# Patient Record
Sex: FEMALE | Race: WHITE | Employment: FULL TIME | URBAN - NONMETROPOLITAN AREA
[De-identification: names, ages, dates, MRNs, and addresses within clinical notes are randomized per-mention and may not be internally consistent; named-entity substitution may affect disease eponyms.]

---

## 2017-02-27 ENCOUNTER — OFFICE VISIT (OUTPATIENT)
Dept: CARDIOLOGY | Age: 57
End: 2017-02-27

## 2017-02-27 VITALS
WEIGHT: 176.6 LBS | HEIGHT: 61 IN | BODY MASS INDEX: 33.34 KG/M2 | SYSTOLIC BLOOD PRESSURE: 152 MMHG | DIASTOLIC BLOOD PRESSURE: 94 MMHG | HEART RATE: 57 BPM

## 2017-02-27 DIAGNOSIS — R06.02 SOB (SHORTNESS OF BREATH) ON EXERTION: Primary | ICD-10-CM

## 2017-02-27 DIAGNOSIS — R00.2 INTERMITTENT PALPITATIONS: ICD-10-CM

## 2017-02-27 DIAGNOSIS — I10 ESSENTIAL HYPERTENSION: ICD-10-CM

## 2017-02-27 PROCEDURE — 99213 OFFICE O/P EST LOW 20 MIN: CPT | Performed by: INTERNAL MEDICINE

## 2017-02-27 PROCEDURE — 93000 ELECTROCARDIOGRAM COMPLETE: CPT | Performed by: INTERNAL MEDICINE

## 2017-08-28 ENCOUNTER — OFFICE VISIT (OUTPATIENT)
Dept: CARDIOLOGY CLINIC | Age: 57
End: 2017-08-28
Payer: COMMERCIAL

## 2017-08-28 VITALS
HEART RATE: 56 BPM | DIASTOLIC BLOOD PRESSURE: 84 MMHG | WEIGHT: 182.5 LBS | SYSTOLIC BLOOD PRESSURE: 152 MMHG | BODY MASS INDEX: 34.48 KG/M2

## 2017-08-28 DIAGNOSIS — I10 ESSENTIAL HYPERTENSION: Primary | ICD-10-CM

## 2017-08-28 DIAGNOSIS — R06.02 SOB (SHORTNESS OF BREATH) ON EXERTION: ICD-10-CM

## 2017-08-28 DIAGNOSIS — R00.2 INTERMITTENT PALPITATIONS: ICD-10-CM

## 2017-08-28 PROCEDURE — 99213 OFFICE O/P EST LOW 20 MIN: CPT | Performed by: INTERNAL MEDICINE

## 2017-08-28 RX ORDER — ASPIRIN 81 MG/1
81 TABLET ORAL DAILY
Qty: 90 TABLET | Refills: 3 | Status: SHIPPED | OUTPATIENT
Start: 2017-08-28 | End: 2021-04-06

## 2017-08-28 RX ORDER — HYDROXYCHLOROQUINE SULFATE 200 MG/1
200 TABLET, FILM COATED ORAL 2 TIMES DAILY
COMMUNITY
End: 2021-04-06

## 2017-08-28 RX ORDER — MELOXICAM 15 MG/1
15 TABLET ORAL DAILY
Status: ON HOLD | COMMUNITY
End: 2018-04-09 | Stop reason: HOSPADM

## 2017-09-11 ENCOUNTER — HOSPITAL ENCOUNTER (OUTPATIENT)
Age: 57
Discharge: HOME | End: 2017-09-11
Payer: COMMERCIAL

## 2017-09-11 LAB
ALT SERPL-CCNC: 18 U/L (ref 11–66)
BASOPHILS # BLD: 0.6 %
BASOPHILS ABSOLUTE: 0 THOU/MM3 (ref 0–0.1)
C-REACTIVE PROTEIN: 0.86 MG/DL (ref 0–1)
CREAT SERPL-MCNC: 0.8 MG/DL (ref 0.4–1.2)
EOSINOPHIL # BLD: 1.4 %
EOSINOPHILS ABSOLUTE: 0.1 THOU/MM3 (ref 0–0.4)
GFR SERPL CREATININE-BSD FRML MDRD: 74 ML/MIN/1.73M2
HCT VFR BLD CALC: 40.3 % (ref 37–47)
HEMOGLOBIN: 13.8 GM/DL (ref 12–16)
LYMPHOCYTES # BLD: 15.8 %
LYMPHOCYTES ABSOLUTE: 1 THOU/MM3 (ref 1–4.8)
MCH RBC QN AUTO: 32.2 PG (ref 27–31)
MCHC RBC AUTO-ENTMCNC: 34.1 GM/DL (ref 33–37)
MCV RBC AUTO: 94.3 FL (ref 81–99)
MONOCYTES # BLD: 6.5 %
MONOCYTES ABSOLUTE: 0.4 THOU/MM3 (ref 0.4–1.3)
NUCLEATED RED BLOOD CELLS: 0 /100 WBC
PDW BLD-RTO: 13.8 % (ref 11.5–14.5)
PLATELET # BLD: 241 THOU/MM3 (ref 130–400)
PMV BLD AUTO: 9.5 MCM (ref 7.4–10.4)
RBC # BLD: 4.28 MILL/MM3 (ref 4.2–5.4)
RBC # BLD: NORMAL 10*6/UL
SEDIMENTATION RATE, ERYTHROCYTE: 23 MM/HR (ref 0–20)
SEG NEUTROPHILS: 75.7 %
SEGMENTED NEUTROPHILS ABSOLUTE COUNT: 4.8 THOU/MM3 (ref 1.8–7.7)
WBC # BLD: 6.3 THOU/MM3 (ref 4.8–10.8)

## 2017-09-11 PROCEDURE — 36415 COLL VENOUS BLD VENIPUNCTURE: CPT

## 2017-09-11 PROCEDURE — 84460 ALANINE AMINO (ALT) (SGPT): CPT

## 2017-09-11 PROCEDURE — 86140 C-REACTIVE PROTEIN: CPT

## 2017-09-11 PROCEDURE — 85651 RBC SED RATE NONAUTOMATED: CPT

## 2017-09-11 PROCEDURE — 82565 ASSAY OF CREATININE: CPT

## 2017-09-11 PROCEDURE — 85025 COMPLETE CBC W/AUTO DIFF WBC: CPT

## 2017-12-11 ENCOUNTER — HOSPITAL ENCOUNTER (OUTPATIENT)
Age: 57
Discharge: HOME | End: 2017-12-11
Payer: COMMERCIAL

## 2017-12-11 LAB
ALT SERPL-CCNC: 13 U/L (ref 11–66)
BASOPHILS # BLD: 0.7 %
BASOPHILS ABSOLUTE: 0 THOU/MM3 (ref 0–0.1)
C-REACTIVE PROTEIN: 1.24 MG/DL (ref 0–1)
CREAT SERPL-MCNC: 0.8 MG/DL (ref 0.4–1.2)
EOSINOPHIL # BLD: 1.2 %
EOSINOPHILS ABSOLUTE: 0.1 THOU/MM3 (ref 0–0.4)
GFR SERPL CREATININE-BSD FRML MDRD: 74 ML/MIN/1.73M2
HCT VFR BLD CALC: 41.3 % (ref 37–47)
HEMOGLOBIN: 14.1 GM/DL (ref 12–16)
LYMPHOCYTES # BLD: 20.1 %
LYMPHOCYTES ABSOLUTE: 1 THOU/MM3 (ref 1–4.8)
MCH RBC QN AUTO: 31.8 PG (ref 27–31)
MCHC RBC AUTO-ENTMCNC: 34.1 GM/DL (ref 33–37)
MCV RBC AUTO: 93.4 FL (ref 81–99)
MONOCYTES # BLD: 7.2 %
MONOCYTES ABSOLUTE: 0.4 THOU/MM3 (ref 0.4–1.3)
NUCLEATED RED BLOOD CELLS: 0 /100 WBC
PDW BLD-RTO: 14.2 % (ref 11.5–14.5)
PLATELET # BLD: 259 THOU/MM3 (ref 130–400)
PMV BLD AUTO: 9.2 MCM (ref 7.4–10.4)
RBC # BLD: 4.42 MILL/MM3 (ref 4.2–5.4)
SEDIMENTATION RATE, ERYTHROCYTE: 30 MM/HR (ref 0–20)
SEG NEUTROPHILS: 70.8 %
SEGMENTED NEUTROPHILS ABSOLUTE COUNT: 3.5 THOU/MM3 (ref 1.8–7.7)
WBC # BLD: 4.9 THOU/MM3 (ref 4.8–10.8)

## 2017-12-11 PROCEDURE — 85025 COMPLETE CBC W/AUTO DIFF WBC: CPT

## 2017-12-11 PROCEDURE — 84460 ALANINE AMINO (ALT) (SGPT): CPT

## 2017-12-11 PROCEDURE — 85651 RBC SED RATE NONAUTOMATED: CPT

## 2017-12-11 PROCEDURE — 36415 COLL VENOUS BLD VENIPUNCTURE: CPT

## 2017-12-11 PROCEDURE — 82565 ASSAY OF CREATININE: CPT

## 2017-12-11 PROCEDURE — 86140 C-REACTIVE PROTEIN: CPT

## 2018-04-07 ENCOUNTER — HOSPITAL ENCOUNTER (OUTPATIENT)
Age: 58
Setting detail: OBSERVATION
Discharge: HOME | End: 2018-04-09
Attending: INTERNAL MEDICINE | Admitting: INTERNAL MEDICINE
Payer: COMMERCIAL

## 2018-04-07 ENCOUNTER — APPOINTMENT (OUTPATIENT)
Dept: GENERAL RADIOLOGY | Age: 58
End: 2018-04-07
Payer: COMMERCIAL

## 2018-04-07 ENCOUNTER — APPOINTMENT (OUTPATIENT)
Dept: CT IMAGING | Age: 58
End: 2018-04-07
Payer: COMMERCIAL

## 2018-04-07 DIAGNOSIS — R06.02 SOB (SHORTNESS OF BREATH) ON EXERTION: Primary | ICD-10-CM

## 2018-04-07 PROBLEM — R07.9 CHEST PAIN: Status: ACTIVE | Noted: 2018-04-07

## 2018-04-07 LAB
ALBUMIN SERPL-MCNC: 4.3 G/DL (ref 3.5–5.1)
ALLEN TEST: POSITIVE
ALP BLD-CCNC: 105 U/L (ref 38–126)
ALT SERPL-CCNC: 10 U/L (ref 11–66)
ANION GAP SERPL CALCULATED.3IONS-SCNC: 20 MEQ/L (ref 8–16)
AST SERPL-CCNC: 15 U/L (ref 5–40)
BASE EXCESS (CALCULATED): -2 MMOL/L (ref -2.5–2.5)
BASOPHILS # BLD: 0 %
BASOPHILS ABSOLUTE: 0 THOU/MM3 (ref 0–0.1)
BILIRUB SERPL-MCNC: 0.8 MG/DL (ref 0.3–1.2)
BILIRUBIN DIRECT: < 0.2 MG/DL (ref 0–0.3)
BUN BLDV-MCNC: 15 MG/DL (ref 7–22)
CALCIUM SERPL-MCNC: 9.4 MG/DL (ref 8.5–10.5)
CHLORIDE BLD-SCNC: 96 MEQ/L (ref 98–111)
CO2: 20 MEQ/L (ref 23–33)
COLLECTED BY:: ABNORMAL
CREAT SERPL-MCNC: 0.9 MG/DL (ref 0.4–1.2)
D-DIMER QUANTITATIVE: 1081 NG/ML FEU (ref 0–500)
DEVICE: ABNORMAL
EKG ATRIAL RATE: 86 BPM
EKG P AXIS: 23 DEGREES
EKG P-R INTERVAL: 154 MS
EKG Q-T INTERVAL: 398 MS
EKG QRS DURATION: 82 MS
EKG QTC CALCULATION (BAZETT): 476 MS
EKG R AXIS: -43 DEGREES
EKG T AXIS: 17 DEGREES
EKG VENTRICULAR RATE: 86 BPM
EOSINOPHIL # BLD: 0.1 %
EOSINOPHILS ABSOLUTE: 0 THOU/MM3 (ref 0–0.4)
FLU A ANTIGEN: NEGATIVE
FLU B ANTIGEN: NEGATIVE
GFR SERPL CREATININE-BSD FRML MDRD: 64 ML/MIN/1.73M2
GLUCOSE BLD-MCNC: 112 MG/DL (ref 70–108)
HCO3: 17 MMOL/L (ref 23–28)
HCT VFR BLD CALC: 42.8 % (ref 37–47)
HEMOGLOBIN: 14.6 GM/DL (ref 12–16)
LIPASE: 25.9 U/L (ref 5.6–51.3)
LYMPHOCYTES # BLD: 7.5 %
LYMPHOCYTES ABSOLUTE: 0.9 THOU/MM3 (ref 1–4.8)
MCH RBC QN AUTO: 31.1 PG (ref 27–31)
MCHC RBC AUTO-ENTMCNC: 34.2 GM/DL (ref 33–37)
MCV RBC AUTO: 90.9 FL (ref 81–99)
MONOCYTES # BLD: 6.2 %
MONOCYTES ABSOLUTE: 0.7 THOU/MM3 (ref 0.4–1.3)
NUCLEATED RED BLOOD CELLS: 0 /100 WBC
O2 SATURATION: 97 %
OSMOLALITY CALCULATION: 273.5 MOSMOL/KG (ref 275–300)
PCO2: 17 MMHG (ref 35–45)
PDW BLD-RTO: 13.7 % (ref 11.5–14.5)
PH BLOOD GAS: 7.61 (ref 7.35–7.45)
PLATELET # BLD: 239 THOU/MM3 (ref 130–400)
PMV BLD AUTO: 9.8 FL (ref 7.4–10.4)
PO2: 67 MMHG (ref 71–104)
POTASSIUM SERPL-SCNC: 4 MEQ/L (ref 3.5–5.2)
PRO-BNP: 133.3 PG/ML (ref 0–900)
RBC # BLD: 4.71 MILL/MM3 (ref 4.2–5.4)
SEG NEUTROPHILS: 86.2 %
SEGMENTED NEUTROPHILS ABSOLUTE COUNT: 10.2 THOU/MM3 (ref 1.8–7.7)
SODIUM BLD-SCNC: 136 MEQ/L (ref 135–145)
SOURCE, BLOOD GAS: ABNORMAL
TOTAL PROTEIN: 8.2 G/DL (ref 6.1–8)
TROPONIN T: < 0.01 NG/ML
WBC # BLD: 11.8 THOU/MM3 (ref 4.8–10.8)

## 2018-04-07 PROCEDURE — 6360000004 HC RX CONTRAST MEDICATION: Performed by: PHYSICIAN ASSISTANT

## 2018-04-07 PROCEDURE — 82248 BILIRUBIN DIRECT: CPT

## 2018-04-07 PROCEDURE — 93005 ELECTROCARDIOGRAM TRACING: CPT | Performed by: PHYSICIAN ASSISTANT

## 2018-04-07 PROCEDURE — 6370000000 HC RX 637 (ALT 250 FOR IP): Performed by: PHYSICIAN ASSISTANT

## 2018-04-07 PROCEDURE — 2580000003 HC RX 258: Performed by: INTERNAL MEDICINE

## 2018-04-07 PROCEDURE — 36600 WITHDRAWAL OF ARTERIAL BLOOD: CPT

## 2018-04-07 PROCEDURE — 6360000002 HC RX W HCPCS: Performed by: INTERNAL MEDICINE

## 2018-04-07 PROCEDURE — 99285 EMERGENCY DEPT VISIT HI MDM: CPT

## 2018-04-07 PROCEDURE — 71046 X-RAY EXAM CHEST 2 VIEWS: CPT

## 2018-04-07 PROCEDURE — G0378 HOSPITAL OBSERVATION PER HR: HCPCS

## 2018-04-07 PROCEDURE — 80053 COMPREHEN METABOLIC PANEL: CPT

## 2018-04-07 PROCEDURE — 6370000000 HC RX 637 (ALT 250 FOR IP): Performed by: INTERNAL MEDICINE

## 2018-04-07 PROCEDURE — 83880 ASSAY OF NATRIURETIC PEPTIDE: CPT

## 2018-04-07 PROCEDURE — 85379 FIBRIN DEGRADATION QUANT: CPT

## 2018-04-07 PROCEDURE — 99233 SBSQ HOSP IP/OBS HIGH 50: CPT | Performed by: INTERNAL MEDICINE

## 2018-04-07 PROCEDURE — 71275 CT ANGIOGRAPHY CHEST: CPT

## 2018-04-07 PROCEDURE — 85025 COMPLETE CBC W/AUTO DIFF WBC: CPT

## 2018-04-07 PROCEDURE — 94640 AIRWAY INHALATION TREATMENT: CPT

## 2018-04-07 PROCEDURE — 82803 BLOOD GASES ANY COMBINATION: CPT

## 2018-04-07 PROCEDURE — 87804 INFLUENZA ASSAY W/OPTIC: CPT

## 2018-04-07 PROCEDURE — 84484 ASSAY OF TROPONIN QUANT: CPT

## 2018-04-07 PROCEDURE — 93010 ELECTROCARDIOGRAM REPORT: CPT | Performed by: NUCLEAR MEDICINE

## 2018-04-07 PROCEDURE — 83690 ASSAY OF LIPASE: CPT

## 2018-04-07 PROCEDURE — 36415 COLL VENOUS BLD VENIPUNCTURE: CPT

## 2018-04-07 RX ORDER — FOLIC ACID 1 MG/1
1 TABLET ORAL DAILY
Status: DISCONTINUED | OUTPATIENT
Start: 2018-04-07 | End: 2018-04-09

## 2018-04-07 RX ORDER — POTASSIUM CHLORIDE 20 MEQ/1
40 TABLET, EXTENDED RELEASE ORAL PRN
Status: DISCONTINUED | OUTPATIENT
Start: 2018-04-07 | End: 2018-04-09 | Stop reason: HOSPADM

## 2018-04-07 RX ORDER — AZITHROMYCIN 250 MG/1
250 TABLET, FILM COATED ORAL DAILY
Status: DISCONTINUED | OUTPATIENT
Start: 2018-04-07 | End: 2018-04-09 | Stop reason: HOSPADM

## 2018-04-07 RX ORDER — POTASSIUM CHLORIDE 20MEQ/15ML
40 LIQUID (ML) ORAL PRN
Status: DISCONTINUED | OUTPATIENT
Start: 2018-04-07 | End: 2018-04-09 | Stop reason: HOSPADM

## 2018-04-07 RX ORDER — METOPROLOL TARTRATE 50 MG/1
25 TABLET, FILM COATED ORAL 2 TIMES DAILY
Status: DISCONTINUED | OUTPATIENT
Start: 2018-04-07 | End: 2018-04-09 | Stop reason: HOSPADM

## 2018-04-07 RX ORDER — IPRATROPIUM BROMIDE AND ALBUTEROL SULFATE 2.5; .5 MG/3ML; MG/3ML
1 SOLUTION RESPIRATORY (INHALATION)
Status: DISCONTINUED | OUTPATIENT
Start: 2018-04-08 | End: 2018-04-09 | Stop reason: HOSPADM

## 2018-04-07 RX ORDER — ASPIRIN 81 MG/1
324 TABLET, CHEWABLE ORAL ONCE
Status: COMPLETED | OUTPATIENT
Start: 2018-04-07 | End: 2018-04-07

## 2018-04-07 RX ORDER — PREDNISONE 10 MG/1
15 TABLET ORAL DAILY
Status: DISCONTINUED | OUTPATIENT
Start: 2018-04-10 | End: 2018-04-09 | Stop reason: HOSPADM

## 2018-04-07 RX ORDER — ACETAMINOPHEN 325 MG/1
650 TABLET ORAL EVERY 4 HOURS PRN
Status: DISCONTINUED | OUTPATIENT
Start: 2018-04-07 | End: 2018-04-09 | Stop reason: HOSPADM

## 2018-04-07 RX ORDER — PREDNISONE 10 MG/1
10 TABLET ORAL DAILY
Status: DISCONTINUED | OUTPATIENT
Start: 2018-04-13 | End: 2018-04-09 | Stop reason: HOSPADM

## 2018-04-07 RX ORDER — PREDNISONE 20 MG/1
20 TABLET ORAL DAILY
Status: COMPLETED | OUTPATIENT
Start: 2018-04-07 | End: 2018-04-09

## 2018-04-07 RX ORDER — HYDROXYCHLOROQUINE SULFATE 200 MG/1
200 TABLET, FILM COATED ORAL 2 TIMES DAILY
Status: DISCONTINUED | OUTPATIENT
Start: 2018-04-07 | End: 2018-04-09 | Stop reason: HOSPADM

## 2018-04-07 RX ORDER — OYSTER SHELL CALCIUM WITH VITAMIN D 500; 200 MG/1; [IU]/1
1 TABLET, FILM COATED ORAL 2 TIMES DAILY WITH MEALS
Status: DISCONTINUED | OUTPATIENT
Start: 2018-04-07 | End: 2018-04-07

## 2018-04-07 RX ORDER — PREDNISONE 10 MG/1
5 TABLET ORAL DAILY
Status: DISCONTINUED | OUTPATIENT
Start: 2018-04-16 | End: 2018-04-09 | Stop reason: HOSPADM

## 2018-04-07 RX ORDER — ALBUTEROL SULFATE 2.5 MG/3ML
2.5 SOLUTION RESPIRATORY (INHALATION) EVERY 4 HOURS
Status: DISCONTINUED | OUTPATIENT
Start: 2018-04-07 | End: 2018-04-07 | Stop reason: ALTCHOICE

## 2018-04-07 RX ORDER — ASPIRIN 81 MG/1
81 TABLET, CHEWABLE ORAL DAILY
Status: DISCONTINUED | OUTPATIENT
Start: 2018-04-07 | End: 2018-04-07 | Stop reason: SDUPTHER

## 2018-04-07 RX ORDER — SODIUM CHLORIDE 0.9 % (FLUSH) 0.9 %
10 SYRINGE (ML) INJECTION EVERY 12 HOURS SCHEDULED
Status: DISCONTINUED | OUTPATIENT
Start: 2018-04-07 | End: 2018-04-09 | Stop reason: HOSPADM

## 2018-04-07 RX ORDER — SODIUM CHLORIDE 0.9 % (FLUSH) 0.9 %
10 SYRINGE (ML) INJECTION PRN
Status: DISCONTINUED | OUTPATIENT
Start: 2018-04-07 | End: 2018-04-09 | Stop reason: HOSPADM

## 2018-04-07 RX ORDER — ONDANSETRON 2 MG/ML
4 INJECTION INTRAMUSCULAR; INTRAVENOUS EVERY 6 HOURS PRN
Status: DISCONTINUED | OUTPATIENT
Start: 2018-04-07 | End: 2018-04-09 | Stop reason: HOSPADM

## 2018-04-07 RX ORDER — FAMOTIDINE 20 MG/1
20 TABLET, FILM COATED ORAL 2 TIMES DAILY
Status: DISCONTINUED | OUTPATIENT
Start: 2018-04-07 | End: 2018-04-09 | Stop reason: HOSPADM

## 2018-04-07 RX ORDER — ASPIRIN 81 MG/1
81 TABLET ORAL DAILY
Status: DISCONTINUED | OUTPATIENT
Start: 2018-04-08 | End: 2018-04-09 | Stop reason: HOSPADM

## 2018-04-07 RX ORDER — POTASSIUM CHLORIDE 7.45 MG/ML
10 INJECTION INTRAVENOUS PRN
Status: DISCONTINUED | OUTPATIENT
Start: 2018-04-07 | End: 2018-04-09 | Stop reason: HOSPADM

## 2018-04-07 RX ORDER — NITROGLYCERIN 0.4 MG/1
0.4 TABLET SUBLINGUAL EVERY 5 MIN PRN
Status: DISCONTINUED | OUTPATIENT
Start: 2018-04-07 | End: 2018-04-09 | Stop reason: HOSPADM

## 2018-04-07 RX ADMIN — IOPAMIDOL 80 ML: 755 INJECTION, SOLUTION INTRAVENOUS at 13:08

## 2018-04-07 RX ADMIN — Medication 10 ML: at 21:00

## 2018-04-07 RX ADMIN — ALBUTEROL SULFATE 2.5 MG: 2.5 SOLUTION RESPIRATORY (INHALATION) at 17:26

## 2018-04-07 RX ADMIN — METOPROLOL TARTRATE 25 MG: 50 TABLET, FILM COATED ORAL at 20:55

## 2018-04-07 RX ADMIN — PREDNISONE 20 MG: 20 TABLET ORAL at 17:09

## 2018-04-07 RX ADMIN — FAMOTIDINE 20 MG: 20 TABLET, FILM COATED ORAL at 20:55

## 2018-04-07 RX ADMIN — IPRATROPIUM BROMIDE 0.5 MG: 0.5 SOLUTION RESPIRATORY (INHALATION) at 17:26

## 2018-04-07 RX ADMIN — AZITHROMYCIN 250 MG: 250 TABLET, FILM COATED ORAL at 17:09

## 2018-04-07 RX ADMIN — ASPIRIN 81 MG 243 MG: 81 TABLET ORAL at 13:21

## 2018-04-07 RX ADMIN — HYDROXYCHLOROQUINE SULFATE 200 MG: 200 TABLET, FILM COATED ORAL at 20:55

## 2018-04-07 RX ADMIN — FOLIC ACID 1 MG: 1 TABLET ORAL at 17:10

## 2018-04-07 ASSESSMENT — PAIN DESCRIPTION - LOCATION: LOCATION: CHEST

## 2018-04-07 ASSESSMENT — ENCOUNTER SYMPTOMS
SORE THROAT: 0
ABDOMINAL PAIN: 0
SINUS PAIN: 0
CHEST TIGHTNESS: 1
BACK PAIN: 0
EYE REDNESS: 0
DIARRHEA: 0
VOMITING: 0
RHINORRHEA: 1
CONSTIPATION: 0
COLOR CHANGE: 0
COUGH: 1
NAUSEA: 0
SINUS PRESSURE: 0
WHEEZING: 0
EYE DISCHARGE: 0
SHORTNESS OF BREATH: 1

## 2018-04-07 ASSESSMENT — PAIN DESCRIPTION - DESCRIPTORS
DESCRIPTORS: TIGHTNESS
DESCRIPTORS: TIGHTNESS

## 2018-04-07 ASSESSMENT — PAIN DESCRIPTION - PAIN TYPE: TYPE: ACUTE PAIN

## 2018-04-07 ASSESSMENT — PAIN DESCRIPTION - PROGRESSION: CLINICAL_PROGRESSION: GRADUALLY IMPROVING

## 2018-04-07 ASSESSMENT — PAIN DESCRIPTION - FREQUENCY
FREQUENCY: CONTINUOUS
FREQUENCY: CONTINUOUS

## 2018-04-07 ASSESSMENT — PAIN SCALES - GENERAL: PAINLEVEL_OUTOF10: 5

## 2018-04-07 ASSESSMENT — PAIN DESCRIPTION - ORIENTATION: ORIENTATION: MID

## 2018-04-08 LAB
ANION GAP SERPL CALCULATED.3IONS-SCNC: 15 MEQ/L (ref 8–16)
BUN BLDV-MCNC: 16 MG/DL (ref 7–22)
CALCIUM SERPL-MCNC: 9.4 MG/DL (ref 8.5–10.5)
CHLORIDE BLD-SCNC: 100 MEQ/L (ref 98–111)
CHOLESTEROL, TOTAL: 179 MG/DL (ref 100–199)
CO2: 23 MEQ/L (ref 23–33)
CREAT SERPL-MCNC: 0.7 MG/DL (ref 0.4–1.2)
GFR SERPL CREATININE-BSD FRML MDRD: 86 ML/MIN/1.73M2
GLUCOSE BLD-MCNC: 134 MG/DL (ref 70–108)
HCT VFR BLD CALC: 38 % (ref 37–47)
HDLC SERPL-MCNC: 67 MG/DL
HEMOGLOBIN: 13.3 GM/DL (ref 12–16)
LDL CHOLESTEROL CALCULATED: 103 MG/DL
MCH RBC QN AUTO: 32.2 PG (ref 27–31)
MCHC RBC AUTO-ENTMCNC: 35.1 GM/DL (ref 33–37)
MCV RBC AUTO: 91.8 FL (ref 81–99)
PDW BLD-RTO: 13.4 % (ref 11.5–14.5)
PLATELET # BLD: 228 THOU/MM3 (ref 130–400)
PMV BLD AUTO: 10 FL (ref 7.4–10.4)
POTASSIUM REFLEX MAGNESIUM: 4.3 MEQ/L (ref 3.5–5.2)
RBC # BLD: 4.14 MILL/MM3 (ref 4.2–5.4)
SODIUM BLD-SCNC: 138 MEQ/L (ref 135–145)
TRIGL SERPL-MCNC: 47 MG/DL (ref 0–199)
WBC # BLD: 9.7 THOU/MM3 (ref 4.8–10.8)

## 2018-04-08 PROCEDURE — 36415 COLL VENOUS BLD VENIPUNCTURE: CPT

## 2018-04-08 PROCEDURE — 80048 BASIC METABOLIC PNL TOTAL CA: CPT

## 2018-04-08 PROCEDURE — 80061 LIPID PANEL: CPT

## 2018-04-08 PROCEDURE — 2580000003 HC RX 258: Performed by: INTERNAL MEDICINE

## 2018-04-08 PROCEDURE — 94640 AIRWAY INHALATION TREATMENT: CPT

## 2018-04-08 PROCEDURE — 6370000000 HC RX 637 (ALT 250 FOR IP): Performed by: INTERNAL MEDICINE

## 2018-04-08 PROCEDURE — G0378 HOSPITAL OBSERVATION PER HR: HCPCS

## 2018-04-08 PROCEDURE — 85027 COMPLETE CBC AUTOMATED: CPT

## 2018-04-08 RX ADMIN — IPRATROPIUM BROMIDE AND ALBUTEROL SULFATE 1 AMPULE: .5; 3 SOLUTION RESPIRATORY (INHALATION) at 21:26

## 2018-04-08 RX ADMIN — FAMOTIDINE 20 MG: 20 TABLET, FILM COATED ORAL at 20:23

## 2018-04-08 RX ADMIN — FAMOTIDINE 20 MG: 20 TABLET, FILM COATED ORAL at 08:52

## 2018-04-08 RX ADMIN — HYDROXYCHLOROQUINE SULFATE 200 MG: 200 TABLET, FILM COATED ORAL at 08:52

## 2018-04-08 RX ADMIN — IPRATROPIUM BROMIDE AND ALBUTEROL SULFATE 1 AMPULE: .5; 3 SOLUTION RESPIRATORY (INHALATION) at 12:54

## 2018-04-08 RX ADMIN — ASPIRIN 81 MG: 81 TABLET ORAL at 08:50

## 2018-04-08 RX ADMIN — ACETAMINOPHEN 650 MG: 325 TABLET ORAL at 20:23

## 2018-04-08 RX ADMIN — METOPROLOL TARTRATE 25 MG: 50 TABLET, FILM COATED ORAL at 20:22

## 2018-04-08 RX ADMIN — Medication 10 ML: at 08:53

## 2018-04-08 RX ADMIN — IPRATROPIUM BROMIDE AND ALBUTEROL SULFATE 1 AMPULE: .5; 3 SOLUTION RESPIRATORY (INHALATION) at 16:16

## 2018-04-08 RX ADMIN — PREDNISONE 20 MG: 20 TABLET ORAL at 08:53

## 2018-04-08 RX ADMIN — AZITHROMYCIN 250 MG: 250 TABLET, FILM COATED ORAL at 08:52

## 2018-04-08 RX ADMIN — ACETAMINOPHEN 650 MG: 325 TABLET ORAL at 11:54

## 2018-04-08 RX ADMIN — Medication 10 ML: at 20:23

## 2018-04-08 RX ADMIN — HYDROXYCHLOROQUINE SULFATE 200 MG: 200 TABLET, FILM COATED ORAL at 20:22

## 2018-04-08 RX ADMIN — FOLIC ACID 1 MG: 1 TABLET ORAL at 08:52

## 2018-04-08 RX ADMIN — METOPROLOL TARTRATE 25 MG: 50 TABLET, FILM COATED ORAL at 08:52

## 2018-04-08 RX ADMIN — IPRATROPIUM BROMIDE AND ALBUTEROL SULFATE 1 AMPULE: .5; 3 SOLUTION RESPIRATORY (INHALATION) at 08:47

## 2018-04-08 ASSESSMENT — PAIN DESCRIPTION - PAIN TYPE: TYPE: ACUTE PAIN

## 2018-04-08 ASSESSMENT — PAIN SCALES - GENERAL
PAINLEVEL_OUTOF10: 1
PAINLEVEL_OUTOF10: 6
PAINLEVEL_OUTOF10: 3
PAINLEVEL_OUTOF10: 6
PAINLEVEL_OUTOF10: 1

## 2018-04-08 ASSESSMENT — PAIN DESCRIPTION - FREQUENCY: FREQUENCY: INTERMITTENT

## 2018-04-08 ASSESSMENT — PAIN DESCRIPTION - DESCRIPTORS
DESCRIPTORS: HEADACHE

## 2018-04-08 ASSESSMENT — PAIN DESCRIPTION - ONSET: ONSET: ON-GOING

## 2018-04-08 ASSESSMENT — PAIN DESCRIPTION - LOCATION: LOCATION: HEAD

## 2018-04-09 ENCOUNTER — APPOINTMENT (OUTPATIENT)
Dept: NON INVASIVE DIAGNOSTICS | Age: 58
End: 2018-04-09
Payer: COMMERCIAL

## 2018-04-09 VITALS
HEART RATE: 62 BPM | DIASTOLIC BLOOD PRESSURE: 92 MMHG | TEMPERATURE: 97.8 F | RESPIRATION RATE: 20 BRPM | SYSTOLIC BLOOD PRESSURE: 140 MMHG | WEIGHT: 183.2 LBS | BODY MASS INDEX: 34.59 KG/M2 | OXYGEN SATURATION: 98 % | HEIGHT: 61 IN

## 2018-04-09 LAB
LV EF: 53 %
LVEF MODALITY: NORMAL

## 2018-04-09 PROCEDURE — 6360000002 HC RX W HCPCS

## 2018-04-09 PROCEDURE — 78452 HT MUSCLE IMAGE SPECT MULT: CPT

## 2018-04-09 PROCEDURE — G0378 HOSPITAL OBSERVATION PER HR: HCPCS

## 2018-04-09 PROCEDURE — 93306 TTE W/DOPPLER COMPLETE: CPT

## 2018-04-09 PROCEDURE — 6370000000 HC RX 637 (ALT 250 FOR IP): Performed by: INTERNAL MEDICINE

## 2018-04-09 PROCEDURE — A9500 TC99M SESTAMIBI: HCPCS | Performed by: INTERNAL MEDICINE

## 2018-04-09 PROCEDURE — 93017 CV STRESS TEST TRACING ONLY: CPT

## 2018-04-09 PROCEDURE — 3430000000 HC RX DIAGNOSTIC RADIOPHARMACEUTICAL: Performed by: INTERNAL MEDICINE

## 2018-04-09 PROCEDURE — 2580000003 HC RX 258: Performed by: INTERNAL MEDICINE

## 2018-04-09 PROCEDURE — 94640 AIRWAY INHALATION TREATMENT: CPT

## 2018-04-09 PROCEDURE — 99239 HOSP IP/OBS DSCHRG MGMT >30: CPT | Performed by: INTERNAL MEDICINE

## 2018-04-09 RX ORDER — FOLIC ACID 1 MG/1
1 TABLET ORAL DAILY
Status: DISCONTINUED | OUTPATIENT
Start: 2018-04-09 | End: 2018-04-09 | Stop reason: HOSPADM

## 2018-04-09 RX ORDER — AZITHROMYCIN 250 MG/1
250 TABLET, FILM COATED ORAL DAILY
Qty: 5 TABLET | Refills: 0 | Status: SHIPPED | OUTPATIENT
Start: 2018-04-10 | End: 2018-04-15

## 2018-04-09 RX ORDER — FOLIC ACID 1 MG/1
1 TABLET ORAL DAILY
Status: DISCONTINUED | OUTPATIENT
Start: 2018-04-10 | End: 2018-04-09

## 2018-04-09 RX ORDER — PREDNISONE 10 MG/1
10 TABLET ORAL DAILY
Qty: 5 TABLET | Refills: 0 | Status: SHIPPED | OUTPATIENT
Start: 2018-04-09 | End: 2018-04-14

## 2018-04-09 RX ADMIN — FAMOTIDINE 20 MG: 20 TABLET, FILM COATED ORAL at 10:24

## 2018-04-09 RX ADMIN — PREDNISONE 20 MG: 20 TABLET ORAL at 10:25

## 2018-04-09 RX ADMIN — Medication 34.8 MILLICURIE: at 09:07

## 2018-04-09 RX ADMIN — Medication 10 ML: at 10:26

## 2018-04-09 RX ADMIN — Medication 10.7 MILLICURIE: at 08:03

## 2018-04-09 RX ADMIN — FOLIC ACID 1 MG: 1 TABLET ORAL at 11:40

## 2018-04-09 RX ADMIN — METOPROLOL TARTRATE 25 MG: 50 TABLET, FILM COATED ORAL at 10:24

## 2018-04-09 RX ADMIN — HYDROXYCHLOROQUINE SULFATE 200 MG: 200 TABLET, FILM COATED ORAL at 10:22

## 2018-04-09 RX ADMIN — AZITHROMYCIN 250 MG: 250 TABLET, FILM COATED ORAL at 10:25

## 2018-04-09 RX ADMIN — ACETAMINOPHEN 650 MG: 325 TABLET ORAL at 11:43

## 2018-04-09 RX ADMIN — ASPIRIN 81 MG: 81 TABLET ORAL at 10:23

## 2018-04-09 RX ADMIN — IPRATROPIUM BROMIDE AND ALBUTEROL SULFATE 1 AMPULE: .5; 3 SOLUTION RESPIRATORY (INHALATION) at 12:12

## 2018-04-09 ASSESSMENT — PAIN DESCRIPTION - LOCATION
LOCATION: HEAD
LOCATION: HEAD

## 2018-04-09 ASSESSMENT — PAIN DESCRIPTION - PAIN TYPE
TYPE: ACUTE PAIN
TYPE: ACUTE PAIN

## 2018-04-09 ASSESSMENT — PAIN SCALES - GENERAL
PAINLEVEL_OUTOF10: 3
PAINLEVEL_OUTOF10: 4
PAINLEVEL_OUTOF10: 0

## 2018-04-09 ASSESSMENT — PAIN DESCRIPTION - ORIENTATION
ORIENTATION: ANTERIOR
ORIENTATION: ANTERIOR

## 2018-04-09 ASSESSMENT — PAIN DESCRIPTION - FREQUENCY
FREQUENCY: CONTINUOUS
FREQUENCY: CONTINUOUS

## 2018-04-09 ASSESSMENT — PAIN DESCRIPTION - DESCRIPTORS
DESCRIPTORS: DISCOMFORT
DESCRIPTORS: DISCOMFORT

## 2021-03-09 ENCOUNTER — NURSE ONLY (OUTPATIENT)
Dept: LAB | Age: 61
End: 2021-03-09

## 2021-03-09 LAB
HBV SURFACE AB TITR SER: NEGATIVE {TITER}
HEPATITIS B CORE IGM ANTIBODY: NEGATIVE
HEPATITIS B SURFACE ANTIGEN: NEGATIVE
HEPATITIS C ANTIBODY: NEGATIVE

## 2021-03-12 LAB
HLA-B27: NORMAL
QUANTI TB GOLD PLUS: NEGATIVE
QUANTI TB1 MINUS NIL: 0 IU/ML (ref 0–0.34)
QUANTI TB2 MINUS NIL: 0 IU/ML (ref 0–0.34)
QUANTIFERON MITOGEN MINUS NIL: 6.81 IU/ML
QUANTIFERON NIL: 0.04 IU/ML

## 2021-04-06 ENCOUNTER — HOSPITAL ENCOUNTER (EMERGENCY)
Age: 61
Discharge: HOME | End: 2021-04-06
Attending: EMERGENCY MEDICINE
Payer: COMMERCIAL

## 2021-04-06 ENCOUNTER — APPOINTMENT (OUTPATIENT)
Dept: GENERAL RADIOLOGY | Age: 61
End: 2021-04-06
Payer: COMMERCIAL

## 2021-04-06 VITALS
SYSTOLIC BLOOD PRESSURE: 149 MMHG | TEMPERATURE: 98.7 F | DIASTOLIC BLOOD PRESSURE: 87 MMHG | OXYGEN SATURATION: 96 % | RESPIRATION RATE: 16 BRPM | HEART RATE: 84 BPM

## 2021-04-06 DIAGNOSIS — R09.89 CHEST CONGESTION: ICD-10-CM

## 2021-04-06 DIAGNOSIS — R42 DIZZINESS: Primary | ICD-10-CM

## 2021-04-06 LAB
ALBUMIN SERPL-MCNC: 4.8 G/DL (ref 3.5–5.1)
ALP BLD-CCNC: 117 U/L (ref 38–126)
ALT SERPL-CCNC: 13 U/L (ref 11–66)
ANION GAP SERPL CALCULATED.3IONS-SCNC: 14 MEQ/L (ref 8–16)
AST SERPL-CCNC: 20 U/L (ref 5–40)
BASOPHILS # BLD: 0.5 %
BASOPHILS ABSOLUTE: 0 THOU/MM3 (ref 0–0.1)
BILIRUB SERPL-MCNC: 0.4 MG/DL (ref 0.3–1.2)
BUN BLDV-MCNC: 15 MG/DL (ref 7–22)
CALCIUM SERPL-MCNC: 10.2 MG/DL (ref 8.5–10.5)
CHLORIDE BLD-SCNC: 101 MEQ/L (ref 98–111)
CO2: 25 MEQ/L (ref 23–33)
CREAT SERPL-MCNC: 0.9 MG/DL (ref 0.4–1.2)
EKG ATRIAL RATE: 102 BPM
EKG P AXIS: 41 DEGREES
EKG P-R INTERVAL: 148 MS
EKG Q-T INTERVAL: 348 MS
EKG QRS DURATION: 86 MS
EKG QTC CALCULATION (BAZETT): 453 MS
EKG R AXIS: -54 DEGREES
EKG T AXIS: 43 DEGREES
EKG VENTRICULAR RATE: 102 BPM
EOSINOPHIL # BLD: 0.6 %
EOSINOPHILS ABSOLUTE: 0 THOU/MM3 (ref 0–0.4)
ERYTHROCYTE [DISTWIDTH] IN BLOOD BY AUTOMATED COUNT: 13.2 % (ref 11.5–14.5)
ERYTHROCYTE [DISTWIDTH] IN BLOOD BY AUTOMATED COUNT: 46.7 FL (ref 35–45)
GFR SERPL CREATININE-BSD FRML MDRD: 64 ML/MIN/1.73M2
GLUCOSE BLD-MCNC: 99 MG/DL (ref 70–108)
HCT VFR BLD CALC: 49.7 % (ref 37–47)
HEMOGLOBIN: 16.3 GM/DL (ref 12–16)
IMMATURE GRANS (ABS): 0.02 THOU/MM3 (ref 0–0.07)
IMMATURE GRANULOCYTES: 0.2 %
LYMPHOCYTES # BLD: 13.6 %
LYMPHOCYTES ABSOLUTE: 1.1 THOU/MM3 (ref 1–4.8)
MCH RBC QN AUTO: 31.5 PG (ref 26–33)
MCHC RBC AUTO-ENTMCNC: 32.8 GM/DL (ref 32.2–35.5)
MCV RBC AUTO: 95.9 FL (ref 81–99)
MONOCYTES # BLD: 6.9 %
MONOCYTES ABSOLUTE: 0.6 THOU/MM3 (ref 0.4–1.3)
NUCLEATED RED BLOOD CELLS: 0 /100 WBC
OSMOLALITY CALCULATION: 280.3 MOSMOL/KG (ref 275–300)
PLATELET # BLD: 255 THOU/MM3 (ref 130–400)
PMV BLD AUTO: 11.1 FL (ref 9.4–12.4)
POTASSIUM SERPL-SCNC: 4 MEQ/L (ref 3.5–5.2)
PRO-BNP: 168.8 PG/ML (ref 0–900)
RBC # BLD: 5.18 MILL/MM3 (ref 4.2–5.4)
SEG NEUTROPHILS: 78.2 %
SEGMENTED NEUTROPHILS ABSOLUTE COUNT: 6.3 THOU/MM3 (ref 1.8–7.7)
SODIUM BLD-SCNC: 140 MEQ/L (ref 135–145)
TOTAL PROTEIN: 8.7 G/DL (ref 6.1–8)
TROPONIN T: < 0.01 NG/ML
WBC # BLD: 8.1 THOU/MM3 (ref 4.8–10.8)

## 2021-04-06 PROCEDURE — 93005 ELECTROCARDIOGRAM TRACING: CPT | Performed by: EMERGENCY MEDICINE

## 2021-04-06 PROCEDURE — 99284 EMERGENCY DEPT VISIT MOD MDM: CPT

## 2021-04-06 PROCEDURE — 85025 COMPLETE CBC W/AUTO DIFF WBC: CPT

## 2021-04-06 PROCEDURE — 36415 COLL VENOUS BLD VENIPUNCTURE: CPT

## 2021-04-06 PROCEDURE — 80053 COMPREHEN METABOLIC PANEL: CPT

## 2021-04-06 PROCEDURE — 2580000003 HC RX 258: Performed by: EMERGENCY MEDICINE

## 2021-04-06 PROCEDURE — 71045 X-RAY EXAM CHEST 1 VIEW: CPT

## 2021-04-06 PROCEDURE — 84484 ASSAY OF TROPONIN QUANT: CPT

## 2021-04-06 PROCEDURE — 83880 ASSAY OF NATRIURETIC PEPTIDE: CPT

## 2021-04-06 RX ORDER — MECLIZINE HYDROCHLORIDE CHEWABLE TABLETS 25 MG/1
25 TABLET, CHEWABLE ORAL ONCE
Status: COMPLETED | OUTPATIENT
Start: 2021-04-06 | End: 2021-04-06

## 2021-04-06 RX ORDER — MECLIZINE HYDROCHLORIDE 25 MG/1
25 TABLET ORAL 3 TIMES DAILY PRN
Qty: 15 TABLET | Refills: 0 | Status: SHIPPED | OUTPATIENT
Start: 2021-04-06 | End: 2021-04-16

## 2021-04-06 RX ORDER — 0.9 % SODIUM CHLORIDE 0.9 %
1000 INTRAVENOUS SOLUTION INTRAVENOUS ONCE
Status: COMPLETED | OUTPATIENT
Start: 2021-04-06 | End: 2021-04-06

## 2021-04-06 RX ADMIN — MECLIZINE HYDROCHLORIDE CHEWABLE TABLETS 25 MG: 25 TABLET, CHEWABLE ORAL at 15:27

## 2021-04-06 RX ADMIN — SODIUM CHLORIDE 1000 ML: 9 INJECTION, SOLUTION INTRAVENOUS at 15:27

## 2021-04-06 ASSESSMENT — ENCOUNTER SYMPTOMS
PHOTOPHOBIA: 0
COUGH: 0
VOMITING: 0
NAUSEA: 0
EYE REDNESS: 0
CHEST TIGHTNESS: 1
ABDOMINAL DISTENTION: 0
DIARRHEA: 0
EYE PAIN: 0
EYE ITCHING: 0
SORE THROAT: 0
CONSTIPATION: 0
ABDOMINAL PAIN: 0
BACK PAIN: 0
EYE DISCHARGE: 0
STRIDOR: 0
SHORTNESS OF BREATH: 0
WHEEZING: 0
RHINORRHEA: 0

## 2021-04-06 NOTE — ED NOTES
Pt to the ED via self. Patient presents with complaints of chest tightness and numbness in the hands. Patient states that that it has been going on over the last 3 days. EKG done, IV inserted. Patient is alert and oriented x 4. Respirations are regular and unlabored. Patient provided blanket. Family at the bedside and call light within reach.        Klaudia Park RN  04/06/21 7631

## 2021-04-06 NOTE — ED PROVIDER NOTES
[address] ENCOUNTER      PATIENT NAME: Shalom Aguirre  MRN: [id number]  : 1960  HERNANDEZ: 2021  PROVIDER: Yandel Tucker MD      CHIEF COMPLAINT       Chief Complaint   Patient presents with    Chest Pain    Numbness     Hands       Nurses Notes reviewed and I agree except as noted in the HPI. HISTORY OF PRESENT ILLNESS    Shalom Aguirre is a 61 y.o. female who presents to Emergency Department with Chest Pain and Numbness (Hands)      Onset: Gradual  Location: At home and work  Quality: dizziness and chest tightness  Radiation: No pain  Severity: Mild  Timing: Last four days (since 4/3/2021). Modifying factors: Worse on body position change. Better on resting. Duration: Persistent  Context: Past medical history remarkable for hypertension, obesity, and arthritis. No prior history of CAD. Normal stress test on 2018 at [facility]. Dizziness is lightheaded feeling and spinning feeling. This HPI was provided by the patient. REVIEW OF SYSTEMS     Review of Systems   Constitutional: Negative for activity change, appetite change, chills, fatigue, fever and unexpected weight change. HENT: Negative for congestion, ear discharge, ear pain, hearing loss, nosebleeds, rhinorrhea and sore throat. Eyes: Negative for photophobia, pain, discharge, redness and itching. Respiratory: Positive for chest tightness. Negative for cough, shortness of breath, wheezing and stridor. Cardiovascular: Negative for chest pain, palpitations and leg swelling. Gastrointestinal: Negative for abdominal distention, abdominal pain, constipation, diarrhea, nausea and vomiting. Endocrine: Negative for cold intolerance, heat intolerance, polydipsia and polyphagia. Genitourinary: Negative for dysuria, flank pain, frequency and hematuria. Musculoskeletal: Negative for arthralgias, back pain, gait problem, myalgias, neck pain and neck stiffness.    Skin: Negative for pallor, rash and wound. Allergic/Immunologic: Negative for environmental allergies and food allergies. Neurological: Positive for dizziness. Negative for tremors, syncope, weakness and headaches. Psychiatric/Behavioral: Negative for agitation, behavioral problems, confusion, self-injury, sleep disturbance and suicidal ideas. PAST MEDICAL HISTORY     Past Medical History:   Diagnosis Date    Arthritis     Hypertension        SURGICAL HISTORY     History reviewed. No pertinent surgical history. CURRENT MEDICATIONS       Discharge Medication List as of 2021  5:26 PM          ALLERGIES     Patient has no known allergies. FAMILY HISTORY     She indicated that her mother is . She indicated that her father is . family history includes Cancer in her father. SOCIAL HISTORY      reports that she has never smoked. She has never used smokeless tobacco. She reports that she does not drink alcohol or use drugs. PHYSICAL EXAM     INITIAL VITALS:    oral temperature is 98.7 °F (37.1 °C). Her blood pressure is 149/87 (abnormal) and her pulse is 84. Her respiration is 16 and oxygen saturation is 96%. Physical Exam  Vitals signs and nursing note reviewed. Constitutional:       Appearance: She is well-developed. She is not diaphoretic. HENT:      Head: Normocephalic and atraumatic. Nose: Nose normal.   Eyes:      General: No scleral icterus. Right eye: No discharge. Left eye: No discharge. Conjunctiva/sclera: Conjunctivae normal.      Pupils: Pupils are equal, round, and reactive to light. Neck:      Musculoskeletal: Normal range of motion and neck supple. Vascular: No JVD. Trachea: No tracheal deviation. Cardiovascular:      Rate and Rhythm: Normal rate and regular rhythm. Heart sounds: Normal heart sounds. No murmur. No friction rub. No gallop. Pulmonary:      Effort: Pulmonary effort is normal. No respiratory distress.       Breath sounds: Normal breath sounds. No stridor. No wheezing or rales. Chest:      Chest wall: No tenderness. Abdominal:      General: Bowel sounds are normal. There is no distension. Palpations: Abdomen is soft. There is no mass. Tenderness: There is no abdominal tenderness. There is no guarding or rebound. Hernia: No hernia is present. Musculoskeletal:         General: No tenderness or deformity. Lymphadenopathy:      Cervical: No cervical adenopathy. Skin:     General: Skin is warm and dry. Capillary Refill: Capillary refill takes less than 2 seconds. Coloration: Skin is not pale. Findings: No erythema or rash. Neurological:      Mental Status: She is alert and oriented to person, place, and time. Cranial Nerves: No cranial nerve deficit. Sensory: No sensory deficit. Motor: No abnormal muscle tone. Coordination: Coordination normal.      Deep Tendon Reflexes: Reflexes normal.   Psychiatric:         Behavior: Behavior normal.         Thought Content: Thought content normal.         Judgment: Judgment normal.         MEDICAL DEDISION MAKINGS:   3:22 PM: Patient is seen and evaluated in a timely fashion. ED nurse's documentations are reviewed. DIFFERENTIAL DIAGNOSIS:  Include but not limited to: Unstable angina, angina, anxiety, PE, atypical chest pain, aortic dissection, pneumonia, GERD, costochondritis, pleurisy, chest wall pain, dyspnea, CHF, COPD, bronchitis, biliary disease. EKG:    Interpreted by ED physician  Sinus tachycardia, occasional PVC  Ventricular rate 102 bpm  MD interval 148 ms  QRS duration 86 ms   ms  No ST elevation or acute T wave    LAB RESULTS:  Results for orders placed or performed during the hospital encounter of 04/06/21   CBC Auto Differential   Result Value Ref Range    WBC 8.1 4.8 - 10.8 thou/mm3    RBC 5.18 4.20 - 5.40 mill/mm3    Hemoglobin 16.3 (H) 12.0 - 16.0 gm/dl    Hematocrit 49.7 (H) 37.0 - 47.0 %    MCV 95.9 81.0 - 99.0 fL 2. Otherwise normal chest. No acute findings. **This report has been created using voice recognition software. It may contain minor errors which are inherent in voice recognition technology. **      Final report electronically signed by Dr. Rip Christian on 4/6/2021 4:15 PM          RATIONALE:    No more dizziness in ED. ED work-ups are reassuring. Normal troponin and normal BNP. Chest x-ray interpreted by radiologist as questionable mild cardiomegaly, but given that patient has normal BNP and normal lung exam, I doubt patient has CHF. Medications   0.9 % sodium chloride bolus (0 mLs Intravenous Stopped 4/6/21 1627)   meclizine (ANTIVERT) chewable tablet 25 mg (25 mg Oral Given 4/6/21 1527)     Discharged with cardiology follow-up in 3 days. She is prescribed meclizine. CRITICAL CARE:   None    CONSULTS:  None    PROCEDURES:  None    RE-EXAMINATION AND RE-EVALUATION   Stable    VITALS IN ED  Vitals:    04/06/21 1500 04/06/21 1626 04/06/21 1726   BP: (!) 145/93 (!) 148/89 (!) 149/87   Pulse: 79 72 84   Resp: 19 19 16   Temp: 98.7 °F (37.1 °C)     TempSrc: Oral     SpO2: 100% 98% 96%       FINAL IMPRESSION      1. Dizziness    2.  Chest congestion          DISPOSITION/PLAN   Discharge home    PATIENT REFERRED TO:  [facility]  [address]  [phone]  In 3 days  ED discharge follow up      [address]:  Discharge Medication List as of 4/6/2021  5:26 PM      START taking these medications    Details   meclizine (ANTIVERT) 25 MG tablet Take 1 tablet by mouth 3 times daily as needed for Dizziness, Disp-15 tablet, R-0Normal             (Please note that portions of this note were completed with a voice recognition program.  Efforts were made to edit the dictations but occasionally words aremis-transcribed.)    MD Pallavi Graves MD  04/06/21 6151

## 2023-10-24 ENCOUNTER — HOSPITAL ENCOUNTER (INPATIENT)
Age: 63
LOS: 10 days | Discharge: NURSING, REHAB OR LONG-TERM CARE | DRG: 551 | End: 2023-11-03
Attending: ORTHOPAEDIC SURGERY | Admitting: ORTHOPAEDIC SURGERY
Payer: COMMERCIAL

## 2023-10-24 PROBLEM — S32.000A LUMBAR COMPRESSION FRACTURE, CLOSED, INITIAL ENCOUNTER (HCC): Status: ACTIVE | Noted: 2023-10-24

## 2023-10-24 LAB
ANION GAP SERPL CALC-SCNC: 11 MEQ/L (ref 8–16)
BUN SERPL-MCNC: 34 MG/DL (ref 7–22)
CALCIUM SERPL-MCNC: 9.2 MG/DL (ref 8.5–10.5)
CHLORIDE SERPL-SCNC: 103 MEQ/L (ref 98–111)
CO2 SERPL-SCNC: 26 MEQ/L (ref 23–33)
CREAT SERPL-MCNC: 0.8 MG/DL (ref 0.4–1.2)
GFR SERPL CREATININE-BSD FRML MDRD: > 60 ML/MIN/1.73M2
GLUCOSE SERPL-MCNC: 97 MG/DL (ref 70–108)
POTASSIUM SERPL-SCNC: 4.6 MEQ/L (ref 3.5–5.2)
SODIUM SERPL-SCNC: 140 MEQ/L (ref 135–145)

## 2023-10-24 PROCEDURE — 6370000000 HC RX 637 (ALT 250 FOR IP): Performed by: PHYSICIAN ASSISTANT

## 2023-10-24 PROCEDURE — 36415 COLL VENOUS BLD VENIPUNCTURE: CPT

## 2023-10-24 PROCEDURE — 6360000002 HC RX W HCPCS: Performed by: PHYSICIAN ASSISTANT

## 2023-10-24 PROCEDURE — 80048 BASIC METABOLIC PNL TOTAL CA: CPT

## 2023-10-24 PROCEDURE — 1200000000 HC SEMI PRIVATE

## 2023-10-24 RX ORDER — MORPHINE SULFATE 2 MG/ML
2 INJECTION, SOLUTION INTRAMUSCULAR; INTRAVENOUS
Status: DISCONTINUED | OUTPATIENT
Start: 2023-10-24 | End: 2023-10-28

## 2023-10-24 RX ORDER — CYCLOBENZAPRINE HCL 10 MG
10 TABLET ORAL 3 TIMES DAILY PRN
COMMUNITY

## 2023-10-24 RX ORDER — MORPHINE SULFATE 4 MG/ML
4 INJECTION, SOLUTION INTRAMUSCULAR; INTRAVENOUS
Status: DISCONTINUED | OUTPATIENT
Start: 2023-10-24 | End: 2023-10-28

## 2023-10-24 RX ORDER — PANTOPRAZOLE SODIUM 40 MG/1
40 TABLET, DELAYED RELEASE ORAL DAILY
COMMUNITY

## 2023-10-24 RX ORDER — HYDROCODONE BITARTRATE AND ACETAMINOPHEN 5; 325 MG/1; MG/1
1 TABLET ORAL EVERY 4 HOURS PRN
Status: DISCONTINUED | OUTPATIENT
Start: 2023-10-24 | End: 2023-10-26

## 2023-10-24 RX ORDER — HYDROCODONE BITARTRATE AND ACETAMINOPHEN 5; 325 MG/1; MG/1
2 TABLET ORAL EVERY 4 HOURS PRN
Status: DISCONTINUED | OUTPATIENT
Start: 2023-10-24 | End: 2023-10-26

## 2023-10-24 RX ORDER — CYCLOBENZAPRINE HCL 10 MG
10 TABLET ORAL 3 TIMES DAILY PRN
Status: DISCONTINUED | OUTPATIENT
Start: 2023-10-24 | End: 2023-10-27

## 2023-10-24 RX ORDER — M-VIT,TX,IRON,MINS/CALC/FOLIC 27MG-0.4MG
1 TABLET ORAL DAILY
COMMUNITY

## 2023-10-24 RX ORDER — DOCUSATE SODIUM 100 MG/1
200 CAPSULE, LIQUID FILLED ORAL ONCE
Status: ON HOLD | COMMUNITY
End: 2023-11-03 | Stop reason: HOSPADM

## 2023-10-24 RX ORDER — ONDANSETRON 2 MG/ML
4 INJECTION INTRAMUSCULAR; INTRAVENOUS EVERY 6 HOURS PRN
Status: DISCONTINUED | OUTPATIENT
Start: 2023-10-24 | End: 2023-11-03 | Stop reason: HOSPADM

## 2023-10-24 RX ORDER — LOSARTAN POTASSIUM AND HYDROCHLOROTHIAZIDE 12.5; 5 MG/1; MG/1
1 TABLET ORAL DAILY
Status: ON HOLD | COMMUNITY
End: 2023-11-03 | Stop reason: HOSPADM

## 2023-10-24 RX ADMIN — RIVAROXABAN 10 MG: 10 TABLET, FILM COATED ORAL at 20:38

## 2023-10-24 RX ADMIN — CYCLOBENZAPRINE HYDROCHLORIDE 10 MG: 10 TABLET, FILM COATED ORAL at 16:52

## 2023-10-24 RX ADMIN — MORPHINE SULFATE 4 MG: 4 INJECTION, SOLUTION INTRAMUSCULAR; INTRAVENOUS at 14:49

## 2023-10-24 RX ADMIN — HYDROCODONE BITARTRATE AND ACETAMINOPHEN 1 TABLET: 5; 325 TABLET ORAL at 20:35

## 2023-10-24 RX ADMIN — HYDROCODONE BITARTRATE AND ACETAMINOPHEN 2 TABLET: 5; 325 TABLET ORAL at 09:06

## 2023-10-24 RX ADMIN — HYDROCODONE BITARTRATE AND ACETAMINOPHEN 2 TABLET: 5; 325 TABLET ORAL at 14:37

## 2023-10-24 RX ADMIN — HYDROCODONE BITARTRATE AND ACETAMINOPHEN 1 TABLET: 5; 325 TABLET ORAL at 04:16

## 2023-10-24 ASSESSMENT — PAIN SCALES - GENERAL
PAINLEVEL_OUTOF10: 7
PAINLEVEL_OUTOF10: 6
PAINLEVEL_OUTOF10: 5
PAINLEVEL_OUTOF10: 6
PAINLEVEL_OUTOF10: 10
PAINLEVEL_OUTOF10: 6
PAINLEVEL_OUTOF10: 10
PAINLEVEL_OUTOF10: 5
PAINLEVEL_OUTOF10: 3
PAINLEVEL_OUTOF10: 6

## 2023-10-24 ASSESSMENT — PAIN - FUNCTIONAL ASSESSMENT: PAIN_FUNCTIONAL_ASSESSMENT: PREVENTS OR INTERFERES SOME ACTIVE ACTIVITIES AND ADLS

## 2023-10-24 ASSESSMENT — PAIN DESCRIPTION - DESCRIPTORS
DESCRIPTORS: ACHING
DESCRIPTORS: ACHING
DESCRIPTORS: OTHER (COMMENT)

## 2023-10-24 ASSESSMENT — PAIN DESCRIPTION - ORIENTATION
ORIENTATION: RIGHT
ORIENTATION: RIGHT
ORIENTATION: RIGHT;LEFT
ORIENTATION: MID

## 2023-10-24 ASSESSMENT — PAIN DESCRIPTION - LOCATION
LOCATION: LEG
LOCATION: BACK
LOCATION: HIP

## 2023-10-24 NOTE — CONSULTS
[place] [id number]                                  CONSULTATION    PATIENT NAME: Blair Polanco                         :        1960  MED REC NO:   [id number]                           ROOM:       0012  ACCOUNT NO:   [de-identified]                           ADMIT DATE: 10/24/2023  PROVIDER:     Deric Ramos PA-C    CONSULT DATE:  10/24/2023    CONSULTATION REASON:  Lumbar injury. CHIEF COMPLAINT:  Status post right total hip arthroplasty through  anterior approach on the date of 10/19/2023 under the care of Dr. Jose Martell. Low back pain and left lower extremity radicular pain. HISTORY OF PRESENT ILLNESS:  The patient is a 77-year-old female who was  recently evaluated at [address] [place] [address] with complaints  of severe hip pain, left leg radicular pain, and lumbar pain. She has  recently undergone a left total hip arthroplasty through anterior  approach completed by Dr. Jose Martell on the date of 10/19/2023. Postoperatively, she was struggling with significant back, hip and leg  pain, worse on the left hand side with limited ability walking. She was  evaluated in the emergency department at [facility], which  showed irregularity on C2 with widening of the anterior disk space of  L5-S1. There was also evidence of left-sided pars fracture at the L5  level as well. Imaging including MRI demonstrated again widening of the  disk space and left neural foraminal narrowing. She has really  struggled in her postoperative course, just five days out now, with  severe back and left leg pain. She does describe some numbing and  tingling sensation as well and does feel weak in the left lower  extremity. There has been no change in her bowel or bladder continence. DRUG ALLERGIES:  NO KNOWN DRUG ALLERGIES. PAST MEDICAL HISTORY:  Includes history of osteoarthritis and  hypertension.     PAST

## 2023-10-24 NOTE — H&P
Orthopaedic H&P  Patient:  Bang Howard  YOB: 1960  MRN: [id number]     Acct: [de-identified]    PCP: Tamiko Prakash MD  Date of Admission: 10/24/2023  Date of Service: Pt seen/examined on 10/24/2023     Chief Complaint: lumbar injury  History Of Present Illness: 61 y.o. female who presents as a transfer from [facility] for further evaluation of severe hip and left leg radicular pain, lumbar pain. Underwent R DA PITO with Dr Jon Butcher 10/19/23, noted significant low back and hip pain which increased with activity in post operative therapy course. She was discharged and later returned to the ED due to lack of pain control and inability to ambulate. Imaging revealed changes at C3, L5-S1, transfer was initiated for further evaluation by spine team. Her right hip pain is well controlled at this time, paresthesias predominately at the LLE and L low back. No bowel or bladder disturbances. Does admit to some weakness overall post operatively. Past Medical History:        Diagnosis Date    Arthritis     Hypertension        Past Surgical History:    No past surgical history on file. Home Medications:   Prior to Admission medications    Medication Sig Start Date End Date Taking?  Authorizing Provider   Multiple Vitamins-Minerals (THERAPEUTIC MULTIVITAMIN-MINERALS) tablet Take 1 tablet by mouth daily   Yes Oziel Frias MD   Acetaminophen (TYLENOL ARTHRITIS EXT RELIEF PO) Take 650 mg by mouth daily  Patient not taking: Reported on 10/24/2023   Yes Oziel Frias MD   losartan-hydroCHLOROthiazide (HYZAAR) 50-12.5 MG per tablet Take 1 tablet by mouth daily   Yes Oziel Frias MD   cyclobenzaprine (FLEXERIL) 10 MG tablet Take 1 tablet by mouth 3 times daily as needed for Muscle spasms   Yes Oziel Frias MD   docusate sodium (COLACE) 100 MG capsule Take 2 capsules by mouth once   Yes Oziel Frias MD   pantoprazole (PROTONIX) 40 MG tablet Take 1 tablet by mouth daily   Yes Provider, MD Oziel       Current Hospital Medications:    Current Facility-Administered Medications:     ondansetron (ZOFRAN) injection 4 mg, 4 mg, IntraVENous, Q6H PRN, Thai Arellano PA-C    cyclobenzaprine (FLEXERIL) tablet 10 mg, 10 mg, Oral, TID PRN, Thai Arellano PA-C    HYDROcodone-acetaminophen (NORCO) 5-325 MG per tablet 1 tablet, 1 tablet, Oral, Q4H PRN, 1 tablet at 10/24/23 0416 **OR** HYDROcodone-acetaminophen (NORCO) 5-325 MG per tablet 2 tablet, 2 tablet, Oral, Q4H PRN, Thai Arellano PA-C, 2 tablet at 10/24/23 1437    morphine (PF) injection 2 mg, 2 mg, IntraVENous, Q2H PRN **OR** morphine injection 4 mg, 4 mg, IntraVENous, Q2H PRN, Thai Arellano PA-C, 4 mg at 10/24/23 1449    rivaroxaban (XARELTO) tablet 10 mg, 10 mg, Oral, Daily, Rj Orourke PA-C     Allergies:  Patient has no known allergies. Social History:    Social History     Socioeconomic History    Marital status:      Spouse name: Not on file    Number of children: Not on file    Years of education: Not on file    Highest education level: Not on file   Occupational History    Not on file   Tobacco Use    Smoking status: Never    Smokeless tobacco: Never   Substance and Sexual Activity    Alcohol use: No    Drug use: No    Sexual activity: Yes   Other Topics Concern    Not on file   Social History Narrative    Not on file     Social Determinants of Health     Financial Resource Strain: Not on file   Food Insecurity: Not on file   Transportation Needs: Not on file   Physical Activity: Not on file   Stress: Not on file   Social Connections: Not on file   Intimate Partner Violence: Not on file   Housing Stability: Not on file     Family History:        Problem Relation Age of Onset    Cancer Father      Further Family History is noncontributory to this injury.     REVIEW OF SYSTEMS:      Review of Systems - General ROS: negative for - chills, fatigue, fever, malaise or night sweats  Psychological ROS:

## 2023-10-24 NOTE — CARE COORDINATION
Case Management Assessment  Initial Evaluation    Date/Time of Evaluation: 10/24/2023 1:06 PM  Assessment Completed by: Katie Mir RN    If patient is discharged prior to next notation, then this note serves as note for discharge by case management. Patient Name: Remy Lopez                   YOB: 1960  Diagnosis: Lumbar compression fracture, closed, initial encounter [facility]) [S32.000A]                   Date / Time: 10/24/2023 12:19 AM  Location: [address] [place] [address]     Patient Admission Status: Inpatient   Readmission Risk Low 0-14, Mod 15-19), High > 20: Readmission Risk Score: 9    Current PCP: Adilson Sanchez MD  PCP verified by CM? Yes    Chart Reviewed: Yes      History Provided by: Patient  Patient Orientation: Alert and Oriented    Patient Cognition: Alert    Hospitalization in the last 30 days (Readmission):  No    If yes, Readmission Assessment in CM Navigator will be completed. Advance Directives:      Code Status: Prior   Patient's Primary Decision Maker is: Patient Declined (Legal Next of [facility] as Decision Maker)      Discharge Planning:    Patient lives with: Spouse/Significant Other Type of Home: House  Primary Care Giver: Self  Patient Support Systems include: Spouse/Significant Other   Current Financial resources: Other (Comment) ([employer])  Current community resources: None  Current services prior to admission: Durable Medical Equipment            Current DME: Janee Members            Type of Home Care services:  None    ADLS  Prior functional level: Independent in ADLs/IADLs  Current functional level: Independent in ADLs/IADLs    Family can provide assistance at DC: Yes  Would you like Case Management to discuss the discharge plan with any other family members/significant others, and if so, who?  No  Plans to Return to Present Housing: Yes (considering therapy options)  Other Identified Issues/Barriers to RETURNING to current housing: none  Potential Assistance needed at discharge: [facility], Other (Comment) (ipr vs snf vs op or hh therapy)            Potential DME: Other (Comment) (TBD)  Patient expects to discharge to: [address] [place] [address] for transportation at discharge:      Financial    Payor: Jay Garcia / Plan: [place] PPO / Product Type: *No Product type* /     Does insurance require precert for SNF: Yes    Potential assistance Purchasing Medications: No  Meds-to-Beds request: Yes      [facility] [id number]  [facility], [address] [place] [address] Rd  [address]  Phone: [phone] Fax: [phone]      Notes:    Factors facilitating achievement of predicted outcomes: Family support and Good insight into deficits    Barriers to discharge: Pain and Medical complications    Additional Case Management Notes: Patient was transferred from [address] [place] [address] in [facility] with c/o severe hip, left leg and lumbar pain. She is s/p total R hip replacement 10/19/23. Imaging at OSH showed widening of the L5-S1 disk space and left neural foraminal narrowing. Plans to fit TLSO brace, PO and IV pain control, therapy. Procedure:   S/p total R Hip replacement 10/19. MRI from OSH 10/23: widening of the  L5-S1 disk space and left neural foraminal narrowing. The Plan for Transition of Care is related to the following treatment goals of Lumbar compression fracture, closed, initial encounter [facility]) [V10.892U]    Patient Goals/Plan/Treatment Preferences: Patient is from home with . She has walker and cane available to her. She is current with her PCP, and was an active  prior to surgery. Patient was in 10/10 pain during interview, and does not wish to discuss discharge disposition at this time, but does want to keep IPR as her goal. Praveen from [facility] notified. Transportation/Food Security/Housekeeping Addressed: No issues identified.      Katty Schilling RN  Case Management Department

## 2023-10-24 NOTE — PROGRESS NOTES
[address] [place] COORDINATOR CONSULT    Referral Type: internal    Patient Name: Ligia Shi      MRN: [id number]    : 1960  (64 y.o.)  Gender: female   Race:White (non-)     Payor Source: Payor: [employer] / Plan: [facility] - [place] PPO / Product Type: *No Product type* /   Secondary Payor Source:      Isolation Status: No active isolations    Disciplines Required upon Admission to Inpatient Rehabilitation: Physical Therapy and Occupational Therapy  Post operative: yes OR 10/19/2023 Dr. Maxim Moyer total hip replacement right hip   Fall: No  Dialysis: No  Diet: ADULT DIET; Regular  Discussed patient with  and PM&R provider:  Await therapy evaluations to further determine patient needs. Patient does require precert for post acute care.

## 2023-10-24 NOTE — PROGRESS NOTES
Consult dictated. Ordered a brace through [facility] brace and limb. Ok to be up with therapy after brace is ordered. Use brace when OOB.

## 2023-10-25 PROCEDURE — 6360000002 HC RX W HCPCS: Performed by: PHYSICIAN ASSISTANT

## 2023-10-25 PROCEDURE — 6370000000 HC RX 637 (ALT 250 FOR IP): Performed by: PHYSICIAN ASSISTANT

## 2023-10-25 PROCEDURE — 97530 THERAPEUTIC ACTIVITIES: CPT

## 2023-10-25 PROCEDURE — 99222 1ST HOSP IP/OBS MODERATE 55: CPT | Performed by: NURSE PRACTITIONER

## 2023-10-25 PROCEDURE — 97166 OT EVAL MOD COMPLEX 45 MIN: CPT

## 2023-10-25 PROCEDURE — 6370000000 HC RX 637 (ALT 250 FOR IP): Performed by: NURSE PRACTITIONER

## 2023-10-25 PROCEDURE — 97162 PT EVAL MOD COMPLEX 30 MIN: CPT

## 2023-10-25 PROCEDURE — 97535 SELF CARE MNGMENT TRAINING: CPT

## 2023-10-25 PROCEDURE — 1200000000 HC SEMI PRIVATE

## 2023-10-25 RX ORDER — POLYETHYLENE GLYCOL 3350 17 G/17G
17 POWDER, FOR SOLUTION ORAL DAILY
Status: DISCONTINUED | OUTPATIENT
Start: 2023-10-25 | End: 2023-11-03 | Stop reason: HOSPADM

## 2023-10-25 RX ORDER — GABAPENTIN 300 MG/1
300 CAPSULE ORAL 3 TIMES DAILY
Status: DISCONTINUED | OUTPATIENT
Start: 2023-10-25 | End: 2023-10-26

## 2023-10-25 RX ORDER — DOCUSATE SODIUM 100 MG/1
100 CAPSULE, LIQUID FILLED ORAL 2 TIMES DAILY
Status: DISCONTINUED | OUTPATIENT
Start: 2023-10-25 | End: 2023-11-03 | Stop reason: HOSPADM

## 2023-10-25 RX ORDER — SENNOSIDES A AND B 8.6 MG/1
2 TABLET, FILM COATED ORAL NIGHTLY
Status: DISCONTINUED | OUTPATIENT
Start: 2023-10-25 | End: 2023-11-03 | Stop reason: HOSPADM

## 2023-10-25 RX ADMIN — MORPHINE SULFATE 4 MG: 4 INJECTION, SOLUTION INTRAMUSCULAR; INTRAVENOUS at 07:26

## 2023-10-25 RX ADMIN — CYCLOBENZAPRINE HYDROCHLORIDE 10 MG: 10 TABLET, FILM COATED ORAL at 09:21

## 2023-10-25 RX ADMIN — HYDROCODONE BITARTRATE AND ACETAMINOPHEN 2 TABLET: 5; 325 TABLET ORAL at 09:22

## 2023-10-25 RX ADMIN — HYDROCODONE BITARTRATE AND ACETAMINOPHEN 1 TABLET: 5; 325 TABLET ORAL at 01:22

## 2023-10-25 RX ADMIN — DOCUSATE SODIUM 100 MG: 100 CAPSULE, LIQUID FILLED ORAL at 23:39

## 2023-10-25 RX ADMIN — MORPHINE SULFATE 4 MG: 4 INJECTION, SOLUTION INTRAMUSCULAR; INTRAVENOUS at 11:06

## 2023-10-25 RX ADMIN — HYDROCODONE BITARTRATE AND ACETAMINOPHEN 1 TABLET: 5; 325 TABLET ORAL at 23:39

## 2023-10-25 RX ADMIN — POLYETHYLENE GLYCOL (3350) 17 G: 17 POWDER, FOR SOLUTION ORAL at 11:40

## 2023-10-25 RX ADMIN — DOCUSATE SODIUM 100 MG: 100 CAPSULE, LIQUID FILLED ORAL at 08:31

## 2023-10-25 RX ADMIN — SENNOSIDES 17.2 MG: 8.6 TABLET, FILM COATED ORAL at 23:39

## 2023-10-25 RX ADMIN — GABAPENTIN 300 MG: 300 CAPSULE ORAL at 14:29

## 2023-10-25 RX ADMIN — HYDROCODONE BITARTRATE AND ACETAMINOPHEN 1 TABLET: 5; 325 TABLET ORAL at 05:10

## 2023-10-25 RX ADMIN — GABAPENTIN 300 MG: 300 CAPSULE ORAL at 23:38

## 2023-10-25 RX ADMIN — GABAPENTIN 300 MG: 300 CAPSULE ORAL at 08:31

## 2023-10-25 RX ADMIN — HYDROCODONE BITARTRATE AND ACETAMINOPHEN 2 TABLET: 5; 325 TABLET ORAL at 14:29

## 2023-10-25 RX ADMIN — RIVAROXABAN 10 MG: 10 TABLET, FILM COATED ORAL at 17:42

## 2023-10-25 ASSESSMENT — PAIN SCALES - GENERAL
PAINLEVEL_OUTOF10: 8
PAINLEVEL_OUTOF10: 7
PAINLEVEL_OUTOF10: 10
PAINLEVEL_OUTOF10: 5
PAINLEVEL_OUTOF10: 6
PAINLEVEL_OUTOF10: 10
PAINLEVEL_OUTOF10: 10
PAINLEVEL_OUTOF10: 6

## 2023-10-25 ASSESSMENT — PAIN DESCRIPTION - ONSET: ONSET: ON-GOING

## 2023-10-25 ASSESSMENT — PAIN DESCRIPTION - ORIENTATION
ORIENTATION: LOWER;LEFT
ORIENTATION: LOWER

## 2023-10-25 ASSESSMENT — PAIN - FUNCTIONAL ASSESSMENT
PAIN_FUNCTIONAL_ASSESSMENT: PREVENTS OR INTERFERES SOME ACTIVE ACTIVITIES AND ADLS
PAIN_FUNCTIONAL_ASSESSMENT: PREVENTS OR INTERFERES SOME ACTIVE ACTIVITIES AND ADLS

## 2023-10-25 ASSESSMENT — PAIN DESCRIPTION - DESCRIPTORS
DESCRIPTORS: ACHING
DESCRIPTORS: ACHING

## 2023-10-25 ASSESSMENT — PAIN DESCRIPTION - FREQUENCY: FREQUENCY: INTERMITTENT

## 2023-10-25 ASSESSMENT — PAIN DESCRIPTION - DIRECTION: RADIATING_TOWARDS: RIGHT LEG

## 2023-10-25 ASSESSMENT — PAIN DESCRIPTION - PAIN TYPE: TYPE: ACUTE PAIN

## 2023-10-25 ASSESSMENT — PAIN DESCRIPTION - LOCATION
LOCATION: BACK
LOCATION: BACK

## 2023-10-25 NOTE — PROGRESS NOTES
Will continue to follow patient clinical course and therapy tolerance for rehab referral consideration.

## 2023-10-25 NOTE — CARE COORDINATION
10/25/23, 2:02 PM EDT    DISCHARGE  [facility] day: 1  Location: -12/012-A Reason for admit: Lumbar compression fracture, closed, initial encounter ([address] ) [S32.000A]   Procedure:   S/p total R Hip replacement 10/19/23. MRI from OSH 10/23: widening of the  L5-S1 disk space and left neural foraminal narrowing. 10/25 L/S xray with brace on: pending  Barriers to Discharge: ortho consulted, Norco, Flexeril, Gabapentin, WBAT LLE, TLSO for OOB. Left leg going completely numb when she sat edge of bed with therapy. On Oral Speak,     PCP: Iván Wilkes MD  Readmission Risk Score: 9%  Patient Goals/Plan/Treatment Preferences: IP rehab consulted; not sure pt can tolerate 3 hours of therapy; will follow.  From home with spouse; has some DME

## 2023-10-25 NOTE — PROGRESS NOTES
[facility]  INPATIENT PHYSICAL THERAPY  EVALUATION  [facility] ORTHOPEDICS 7K - 7K-12/012-A    Time In: 7005  Time Out: 9064  Timed Code Treatment Minutes: 11 Minutes  Minutes: 20          Date: 10/25/2023  Patient Name: Natanael Love,  Gender:  female        MRN: [id number]  : 1960  (61 y.o.)      Referring Practitioner: AUBREY Medrano CNP  Diagnosis: Lumbar compression fracture, closed, initial encounter  Additional Pertinent Hx: Per EMr \"61 y.o. female who presents as a transfer from [facility] for further evaluation of severe hip and left leg radicular pain, lumbar pain. Underwent R DA PITO with Dr Son Cain 10/19/23, noted significant low back and hip pain which increased with activity in post operative therapy course. She was discharged and later returned to the ED due to lack of pain control and inability to ambulate. Imaging revealed changes at C3, L5-S1, transfer was initiated for further evaluation by spine team. Her right hip pain is well controlled at this time, paresthesias predominately at the LLE and L low back. No bowel or bladder disturbances. Does admit to some weakness overall post operatively. \"     Restrictions/Precautions:  Restrictions/Precautions: Fall Risk, General Precautions  Required Braces or Orthoses  Spinal: Lumbar Corset  Spinal Other: hard LSO to be worn with all activity and when out of bed  Position Activity Restriction  Spinal Precautions: No Bending, No Lifting, No Twisting  Hip Precautions: Anterior hip precautions  Other position/activity restrictions: non-surgical as of date of eval-monitor upcoming imaging for stability of lumbar region. R PITO 10/19 by  Dr Son Cain. Left sided sciatica like pain    Subjective:  Chart Reviewed: Yes  Patient assessed for rehabilitation services?: Yes  Family / Caregiver Present: No  Subjective: OK to see pt per nursing.  Pt in bed when PT arrived, agreeable to PT session with encouragement as pt has increased back and Method: Verbal  Education Outcome: Verbalized understanding, Demonstrated understanding, Continued education needed       Equipment Recommendations:  Equipment Needed: No    Plan:  Current Treatment Recommendations: Strengthening, Therapeutic activities, Safety education & training, Patient/Caregiver education & training, Endurance training, Equipment evaluation, education, & procurement, Home exercise program  General Plan:  (6x O)    Goals:  Patient Goals : \"be able to move\"  Short Term Goals  Time Frame for Short Term Goals: by discharge  Short Term Goal 1: Pt will demo rolling with SBA with bed flat to return home safely. Short Term Goal 2: Pt will tolerate 10-20 reps of ther ex to increase overall mobility. Short Term Goal 3: PT to assess EOB and mobility. Long Term Goals  Time Frame for Long Term Goals : NA due to short ELOS    Following session, patient left in safe position with all fall risk precautions in place. Pt in bed following session, all needs and call light in reach, alarm on.

## 2023-10-25 NOTE — PLAN OF CARE
Problem: Pain  Goal: Verbalizes/displays adequate comfort level or baseline comfort level  Outcome: Progressing   Patient c/o pain. Prn pain medication administered. Problem: Skin/Tissue Integrity  Goal: Absence of new skin breakdown  Description: 1. Monitor for areas of redness and/or skin breakdown  2. Assess vascular access sites hourly  3. Every 4-6 hours minimum:  Change oxygen saturation probe site  4. Every 4-6 hours:  If on nasal continuous positive airway pressure, respiratory therapy assess nares and determine need for appliance change or resting period. Outcome: Progressing   Patient educated on turning to prevent pressure wounds. Patient allowed writer to put pillows right hip.    Problem: Safety - Adult  Goal: Free from fall injury  Outcome: Progressing

## 2023-10-25 NOTE — PROGRESS NOTES
[facility] OCCUPATIONAL THERAPY  [facility] ORTHOPEDICS 7K  EVALUATION    Time:   Time In: 3192  Time Out: 8754  Timed Code Treatment Minutes: 24 Minutes  Minutes: 37          Date: 10/25/2023  Patient Name: Estrella Corona,   Gender: female      MRN: [id number]  : 1960  (61 y.o.)  Referring Practitioner: AUBREY Chavarria CNP  Diagnosis: lumbar compression fracture, closed initial encounter  Additional Pertinent Hx: 61 y.o. female who presents as a transfer from [facility] for further evaluation of severe hip and left leg radicular pain, lumbar pain. Underwent R DA PITO with Dr Kenia Flower 10/19/23, noted significant low back and hip pain which increased with activity in post operative therapy course. She was discharged and later returned to the ED due to lack of pain control and inability to ambulate. Imaging revealed changes at C3, L5-S1, transfer was initiated for further evaluation by spine team. MRI of the lumbar spine completed on the date of 10/23/2023 at [facility] again shows anterolisthesis of L5-S1, left foraminal  stenosis due to encroachment by disk bulge and facet arthropathy and  widening of the disk space at L5-S1 as well. No surgical intervention warranted at this immediate time. LSO to be worn when up. Restrictions/Precautions:  Restrictions/Precautions: Up as Tolerated, Fall Risk  Required Braces or Orthoses  Spinal Other: hard LSO to be worn with all activity and when out of bed  Position Activity Restriction  Spinal Precautions: No Bending, No Lifting, No Twisting  Hip Precautions: No hip flexion > 90 degrees, No hip internal rotation, No ADduction, No active ABduction  Other position/activity restrictions: non-surgical as of date of eval-monitor upcoming imaging for stability of lumbar region. R PITO 10/19.  Left sided sciatica like pain    Subjective  Chart Reviewed: Yes, Orders, Progress Notes         Pain: 10/10:     Vitals: Oxygen: 96% on room air. Pt begins hyperventilating sitting EOB due to pain in left buttocks region (likely sciatica like pain). Pt requires max cues for PLB and relaxation techniques in order to remain in sitting    Social/Functional History:  Lives With: Spouse  Type of Home: House  Home Layout: Two level, Able to Live on Main level with bedroom/bathroom, Performs ADL's on one level (sleeps in recliner)  Home Equipment: Veronica Hoffman, standard, Wheelchair-manual (has wheels for walker)   Bathroom Shower/Tub: Tub/Shower unit  Bathroom Toilet: Standard  Bathroom Equipment:  (has been sponge bathing)       ADL Assistance: Independent  Homemaking Assistance: Independent  Homemaking Responsibilities: Yes  Ambulation Assistance: Independent  Transfer Assistance: Independent    Active : Yes  Occupation: Full time employment  Type of Occupation: Works full time at [employer]  Additional Comments: Typically indep withunctional mobility using cane or cart while at work. Does not use AD when in the house. Typically indep with ADL and IADLs. VISION:WFL    HEARING:  WFL    COGNITION:  extremely anxious and apprehensive about pain and movement    RANGE OF MOTION:  Bilateral Upper Extremity:  WFL    STRENGTH:  Bilateral Upper Extremity:  Not Tested    SENSATION:   Pt reports her left foot is numb while sitting EOB    ADL:   Unable to complete ADLs this date. Pt has sal catheter in place. Pt unable to tolerate ADLs and is very distracted by pain . BALANCE:  Sitting Balance:  Contact Guard Assistance. Pt tolerates x12 min sitting EOB. Initially requires minimal A to avoid pushing self back into bed and progresses to CGA with left hip lifted off of bed due to significant pain. Standing Balance: unable to safely assess this session. Pt encouraged on multiple attempts to try to stand due to Dr. Emerald Juárez requesting upright imaging in LSO brace but pt adamantly declines stating she cannot do it.  Nurse present and provides additional pain

## 2023-10-26 PROCEDURE — 6370000000 HC RX 637 (ALT 250 FOR IP): Performed by: PHYSICIAN ASSISTANT

## 2023-10-26 PROCEDURE — 97530 THERAPEUTIC ACTIVITIES: CPT

## 2023-10-26 PROCEDURE — 6360000002 HC RX W HCPCS: Performed by: PHYSICIAN ASSISTANT

## 2023-10-26 PROCEDURE — 97110 THERAPEUTIC EXERCISES: CPT

## 2023-10-26 PROCEDURE — 99232 SBSQ HOSP IP/OBS MODERATE 35: CPT | Performed by: NURSE PRACTITIONER

## 2023-10-26 PROCEDURE — 6370000000 HC RX 637 (ALT 250 FOR IP): Performed by: NURSE PRACTITIONER

## 2023-10-26 PROCEDURE — 1200000000 HC SEMI PRIVATE

## 2023-10-26 RX ORDER — DIAZEPAM 5 MG/1
5 TABLET ORAL EVERY 6 HOURS PRN
Status: DISCONTINUED | OUTPATIENT
Start: 2023-10-26 | End: 2023-11-03 | Stop reason: HOSPADM

## 2023-10-26 RX ORDER — OXYCODONE HYDROCHLORIDE AND ACETAMINOPHEN 5; 325 MG/1; MG/1
1 TABLET ORAL EVERY 4 HOURS PRN
Status: DISCONTINUED | OUTPATIENT
Start: 2023-10-26 | End: 2023-11-03 | Stop reason: HOSPADM

## 2023-10-26 RX ORDER — OXYCODONE HYDROCHLORIDE AND ACETAMINOPHEN 5; 325 MG/1; MG/1
2 TABLET ORAL EVERY 4 HOURS PRN
Status: DISCONTINUED | OUTPATIENT
Start: 2023-10-26 | End: 2023-11-03 | Stop reason: HOSPADM

## 2023-10-26 RX ORDER — GABAPENTIN 300 MG/1
600 CAPSULE ORAL 3 TIMES DAILY
Status: DISCONTINUED | OUTPATIENT
Start: 2023-10-26 | End: 2023-11-03 | Stop reason: HOSPADM

## 2023-10-26 RX ADMIN — SENNOSIDES 17.2 MG: 8.6 TABLET, FILM COATED ORAL at 20:56

## 2023-10-26 RX ADMIN — GABAPENTIN 600 MG: 300 CAPSULE ORAL at 13:13

## 2023-10-26 RX ADMIN — DIAZEPAM 5 MG: 5 TABLET ORAL at 10:30

## 2023-10-26 RX ADMIN — OXYCODONE AND ACETAMINOPHEN 2 TABLET: 5; 325 TABLET ORAL at 08:54

## 2023-10-26 RX ADMIN — DOCUSATE SODIUM 100 MG: 100 CAPSULE, LIQUID FILLED ORAL at 08:16

## 2023-10-26 RX ADMIN — GABAPENTIN 600 MG: 300 CAPSULE ORAL at 20:56

## 2023-10-26 RX ADMIN — MORPHINE SULFATE 2 MG: 2 INJECTION, SOLUTION INTRAMUSCULAR; INTRAVENOUS at 11:41

## 2023-10-26 RX ADMIN — RIVAROXABAN 10 MG: 10 TABLET, FILM COATED ORAL at 16:29

## 2023-10-26 RX ADMIN — OXYCODONE AND ACETAMINOPHEN 1 TABLET: 5; 325 TABLET ORAL at 20:56

## 2023-10-26 RX ADMIN — DOCUSATE SODIUM 100 MG: 100 CAPSULE, LIQUID FILLED ORAL at 20:56

## 2023-10-26 RX ADMIN — POLYETHYLENE GLYCOL (3350) 17 G: 17 POWDER, FOR SOLUTION ORAL at 08:16

## 2023-10-26 RX ADMIN — GABAPENTIN 300 MG: 300 CAPSULE ORAL at 08:16

## 2023-10-26 RX ADMIN — DOCUSATE SODIUM 283 MG: 283 LIQUID RECTAL at 13:13

## 2023-10-26 ASSESSMENT — PAIN SCALES - GENERAL
PAINLEVEL_OUTOF10: 7
PAINLEVEL_OUTOF10: 0
PAINLEVEL_OUTOF10: 7
PAINLEVEL_OUTOF10: 5
PAINLEVEL_OUTOF10: 4

## 2023-10-26 ASSESSMENT — PAIN DESCRIPTION - DIRECTION: RADIATING_TOWARDS: RIGHT LEG

## 2023-10-26 ASSESSMENT — PAIN DESCRIPTION - FREQUENCY: FREQUENCY: INTERMITTENT

## 2023-10-26 ASSESSMENT — PAIN DESCRIPTION - LOCATION: LOCATION: BACK

## 2023-10-26 ASSESSMENT — PAIN SCALES - WONG BAKER
WONGBAKER_NUMERICALRESPONSE: 6

## 2023-10-26 ASSESSMENT — PAIN - FUNCTIONAL ASSESSMENT: PAIN_FUNCTIONAL_ASSESSMENT: PREVENTS OR INTERFERES WITH MANY ACTIVE NOT PASSIVE ACTIVITIES

## 2023-10-26 ASSESSMENT — PAIN DESCRIPTION - ORIENTATION: ORIENTATION: LOWER

## 2023-10-26 ASSESSMENT — PAIN DESCRIPTION - DESCRIPTORS: DESCRIPTORS: ACHING

## 2023-10-26 ASSESSMENT — PAIN DESCRIPTION - ONSET: ONSET: ON-GOING

## 2023-10-26 ASSESSMENT — PAIN DESCRIPTION - PAIN TYPE: TYPE: ACUTE PAIN

## 2023-10-26 NOTE — PROGRESS NOTES
[address] [facility]  Occupational Therapy  Daily Note  Time:   Time In: 0570  Time Out: 1200  Timed Code Treatment Minutes: 25 Minutes  Minutes: 25          Date: 10/26/2023  Patient Name: Porsche Faria,   Gender: female      Room: [facility]12/012-A  MRN: [id number]  : 1960  (61 y.o.)  Referring Practitioner: AUBREY Malone CNP  Diagnosis: lumbar compression fracture, closed initial encounter  Additional Pertinent Hx: 61 y.o. female who presents as a transfer from [facility] for further evaluation of severe hip and left leg radicular pain, lumbar pain. Underwent R DA PITO with Dr Stevie Horton 10/19/23, noted significant low back and hip pain which increased with activity in post operative therapy course. She was discharged and later returned to the ED due to lack of pain control and inability to ambulate. Imaging revealed changes at C3, L5-S1, transfer was initiated for further evaluation by spine team. MRI of the lumbar spine completed on the date of 10/23/2023 at [facility] again shows anterolisthesis of L5-S1, left foraminal  stenosis due to encroachment by disk bulge and facet arthropathy and  widening of the disk space at L5-S1 as well. No surgical intervention warranted at this immediate time. LSO to be worn when up. Restrictions/Precautions:  Restrictions/Precautions: Fall Risk, General Precautions  Required Braces or Orthoses  Spinal: Lumbar Corset  Spinal Other: hard LSO to be worn with all activity and when out of bed  Position Activity Restriction  Spinal Precautions: No Bending, No Lifting, No Twisting  Hip Precautions: Anterior hip precautions  Other position/activity restrictions: non-surgical as of date of eval-monitor upcoming imaging for stability of lumbar region. R PITO 10/19 by  Dr Stevie Horton. Left sided sciatica like pain     SUBJECTIVE: Pt in bed in supine on OT arrival with bed completely flat and attempting to eat her lunch.  Therapist

## 2023-10-26 NOTE — PROGRESS NOTES
[facility]  INPATIENT PHYSICAL THERAPY  DAILY NOTE  [facility] ORTHOPEDICS 7K - 7K-12/012-A  Time In: 3852  Time Out: 1013  Timed Code Treatment Minutes: 26 Minutes  Minutes: 26          Date: 10/26/2023  Patient Name: Blair Polanco,  Gender:  female        MRN: [id number]  : 1960  (61 y.o.)     Referring Practitioner: Kelly Moritz, APRN - CNP  Diagnosis: Lumbar compression fracture, closed, initial encounter  Additional Pertinent Hx: Per EMr \"61 y.o. female who presents as a transfer from [facility] for further evaluation of severe hip and left leg radicular pain, lumbar pain. Underwent R DA PITO with Dr Edward Avila 10/19/23, noted significant low back and hip pain which increased with activity in post operative therapy course. She was discharged and later returned to the ED due to lack of pain control and inability to ambulate. Imaging revealed changes at C3, L5-S1, transfer was initiated for further evaluation by spine team. Her right hip pain is well controlled at this time, paresthesias predominately at the LLE and L low back. No bowel or bladder disturbances. Does admit to some weakness overall post operatively. \"     Prior Level of Function:  Lives With: Spouse  Type of Home: House  Home Layout: Two level, Able to Live on Main level with bedroom/bathroom, Performs ADL's on one level  Home Equipment: Lilian Shaw, standard, Debbie Cardenas (has wheels for walker)   Bathroom Shower/Tub: Tub/Shower unit  Bathroom Toilet: Standard  Bathroom Equipment:  (has been sponge bathing)    ADL Assistance: [id number] Independent  Homemaking Responsibilities: Yes  Ambulation Assistance: Independent  Transfer Assistance: Independent  Active : Yes  Additional Comments: Typically indep withunctional mobility using cane or cart while at work. Does not use AD when in the house. Typically indep with ADL and IADLs.     Restrictions/Precautions:  Restrictions/Precautions: Fall mobility. Pt required VC and visual cues for proper technique of exercises with good demo. Encouraged to complete daily to increase overall mobility. Functional Outcome Measures: Completed       ASSESSMENT:  Assessment:  progressed toward MCFP up on side of bed, not able to get fully upright. Activity Tolerance:  Patient tolerance of  treatment: poor. Increased pain     Equipment Recommendations:Equipment Needed: No  Discharge Recommendations: Subacte/Skilled Nursing Facility, not able tolerate 3 hours of therapy a day at this time. Plan: Current Treatment Recommendations: Strengthening, Therapeutic activities, Safety education & training, Patient/Caregiver education & training, Endurance training, Equipment evaluation, education, & procurement, Home exercise program  General Plan:  (6x O)    Patient Education  Patient Education: Plan of Care, Precautions/Restrictions, Bed Mobility, Verbal Exercise Instruction,  - Patient Verbalized Understanding, - Patient Requires Continued Education    Goals:  Patient Goals : \"be able to move\"  Short Term Goals  Time Frame for Short Term Goals: by discharge  Short Term Goal 1: Pt will demo rolling with SBA with bed flat to return home safely. Short Term Goal 2: Pt will tolerate 10-20 reps of ther ex to increase overall mobility. Short Term Goal 3: PT to assess EOB and mobility. Long Term Goals  Time Frame for Long Term Goals : NA due to short ELOS    Following session, patient left in safe position with all fall risk precautions in place. Pt in bed following session, all needs and call light in reach, alarm on.

## 2023-10-26 NOTE — CARE COORDINATION
10/26/23, 2:39 PM EDT    DISCHARGE ON GOING [address] day: 2  Location: -12/012-A Reason for admit: Lumbar compression fracture, closed, initial encounter ([address] ) [S32.000A]   Procedure:   S/p total R Hip replacement 10/19/23. MRI from OSH 10/23: widening of the  L5-S1 disk space and left neural foraminal narrowing. 10/26 L/S xray with brace on: pending  Barriers to Discharge: MS and Percocet. Flexeril, Gabapentin, WBAT LLE, TLSO for when OOB. Cont therapy as able. Miralax and Senna. Stop Neurontin and [place].    PCP: Ciera Bernard MD  Readmission Risk Score: 9%  Patient Goals/Plan/Treatment Preferences: From home with  Merrick Sheppard;  IP  rehab feels pt will not be able to tolerate minimum of 3 hours therapy a day.

## 2023-10-26 NOTE — PROGRESS NOTES
Physical Medicine & Rehabilitation   Progress Note    Reason for Consult:  L5 pars fracture. S/p right THR. Rehab needs    History of Present Illness:  Krysta Mcgill is a 61 y.o. female admitted to [facility] on 10/24/2023. Patient  has a past medical history of Arthritis and Hypertension. Patient presented to [facility] due to severe hip pain with left leg radicular pain and low back pain. Patient had right THR with anterior approach on 10/19 with Dr Sandy Brooks. Post op patient was struggling with back, hip, leg pain on the left side, limiting her mobility. Patient reported numbness and tingling along with LLE weakness. At [facility] imaging showed irregularity on C2 with widening of the anterior disk space of L5-S1. There was also evidence of left-sided pars fracture at the L5 level as well. Imaging including MRI demonstrated again widening of the disk space and left neural foraminal narrowing. Patient was transferred to [facility] for further evaluation by Ortho. Ortho spine evaluated and discussed conservative approach, bracing, and therapy. TLSO was ordered and acquired. 10/25/23: patient seen today in consult. Patient resting in bed, states things are \"terrible\". Patient reports L>R posterior legg numbness into the feet. Left foot also more numb than right. Patient states that she is unable to go down for the lumbar xr, states when she got to side of bed her entire left leg went numb and she had to be laid back down. Message sent to ortho spine regarding this. Patient states her pain is so severe, especially when up. Discussed PM&R and reason for consult. Will follow therapy and progress on acute care. Discussed concern with tolerance of 3 hours a day of therapy. Discussed SNF option if unable to tolerate. Patient understanding. Denies any questions at this time. 10/26/23: Patient seen today, resting in bed. Therapy in to work with patient.  Attempted EOB but patient did not tolerate due to pain and intensifying with sitting up. Discussed need for lumbar XR if patient can tolerate. Discussed not appropriate for IPR due to therapy intolerance. Current Rehabilitation Assessments:  PT:    Range of Motion:  Bilateral Lower Extremity: WFL  Strength:  Bilateral Lower Extremity: Impaired - grossly deconditioned  Balance:  Not able to sit on EOB due to increased pain  Bed Mobility:  Rolling to Left: Minimal Assistance, X 1, with head of bed flat, with rail, with verbal cues , with increased time for completion   Slow pace due to pain, cues for R LE placement to help assist with PT assisting with R LE flexion to push to L side. Pt tolerated about 2-3 min in sidelying. Declined sitting on EOB due to apprehension with pain. Mod A required for positioning in bed. Transfers:  Not Tested, not able to sit on EOB due to pain  Ambulation:  Not Tested      OT:    ADL:   Unable to complete ADLs this date. Pt has sal catheter in place. Pt unable to tolerate ADLs and is very distracted by pain . BALANCE:  Sitting Balance:  Contact Guard Assistance. Pt tolerates x12 min sitting EOB. Initially requires minimal A to avoid pushing self back into bed and progresses to CGA with left hip lifted off of bed due to significant pain. Standing Balance: unable to safely assess this session. Pt encouraged on multiple attempts to try to stand due to Dr. Titi Pan requesting upright imaging in LSO brace but pt adamantly declines stating she cannot do it. Nurse present and provides additional pain medication. PT notified of situation. BED MOBILITY:  Supine to Sit: Maximum Assistance, X 1, with head of bed flat, with verbal cues , with increased time for completion Pt yells out during supine to sit transition   Sit to supine: maximum assist x2.     Review of Systems:  CONSTITUTIONAL:  positive for  fatigue and pain  EYES:  positive for  double vision, blurred vision, blind spots, and visual disturbance  HEENT:  negative  RESPIRATORY:

## 2023-10-26 NOTE — PROGRESS NOTES
Spoke with Parish Uzair will continue to follow patient therapy tolerance for further determination of patient needs.

## 2023-10-26 NOTE — PLAN OF CARE
Problem: Discharge Planning  Goal: Discharge to home or other facility with appropriate resources  Outcome: Progressing  Flowsheets (Taken 10/26/2023 0517)  Discharge to home or other facility with appropriate resources: Identify barriers to discharge with patient and caregiver     Problem: Skin/Tissue Integrity  Goal: Absence of new skin breakdown  Description: 1. Monitor for areas of redness and/or skin breakdown  2. Assess vascular access sites hourly  3. Every 4-6 hours minimum:  Change oxygen saturation probe site  4. Every 4-6 hours:  If on nasal continuous positive airway pressure, respiratory therapy assess nares and determine need for appliance change or resting period.   Outcome: Progressing     Problem: Safety - Adult  Goal: Free from fall injury  Outcome: Progressing  Flowsheets (Taken 10/26/2023 0517)  Free From Fall Injury: Instruct family/caregiver on patient safety     Problem: Pain  Goal: Verbalizes/displays adequate comfort level or baseline comfort level  Outcome: Progressing  Flowsheets (Taken 10/26/2023 0517)  Verbalizes/displays adequate comfort level or baseline comfort level:   Encourage patient to monitor pain and request assistance   Assess pain using appropriate pain scale   Administer analgesics based on type and severity of pain and evaluate response   Implement non-pharmacological measures as appropriate and evaluate response   Notify Licensed Independent Practitioner if interventions unsuccessful or patient reports new pain     Problem: Respiratory - Adult  Goal: Achieves optimal ventilation and oxygenation  Outcome: Progressing  Flowsheets (Taken 10/26/2023 0517)  Achieves optimal ventilation and oxygenation:   Assess for changes in respiratory status   Assess for changes in mentation and behavior   Position to facilitate oxygenation and minimize respiratory effort   Oxygen supplementation based on oxygen saturation or arterial blood gases   Encourage broncho-pulmonary hygiene including cough, deep breathe, incentive spirometry   Assess and instruct to report shortness of breath or any respiratory difficulty   Respiratory therapy support as indicated     Problem: Skin/Tissue Integrity - Adult  Goal: Skin integrity remains intact  Outcome: Progressing  Flowsheets (Taken 10/26/2023 0517)  Skin Integrity Remains Intact: Monitor for areas of redness and/or skin breakdown     Problem: Musculoskeletal - Adult  Goal: Return mobility to safest level of function  Outcome: Progressing  Flowsheets (Taken 10/26/2023 0517)  Return Mobility to Safest Level of Function:   Assess patient stability and activity tolerance for standing, transferring and ambulating with or without assistive devices   Obtain physical therapy/occupational therapy consults as needed   Instruct patient/family in ordered activity level  Goal: Return ADL status to a safe level of function  Outcome: Progressing  Flowsheets (Taken 10/26/2023 0517)  Return ADL Status to a Safe Level of Function:   Administer medication as ordered   Assess activities of daily living deficits and provide assistive devices as needed   Obtain physical therapy/occupational therapy consults as needed   Assist and instruct patient to increase activity and self care as tolerated     Problem: Genitourinary - Adult  Goal: Urinary catheter remains patent  Outcome: Progressing  Flowsheets (Taken 10/26/2023 0517)  Urinary catheter remains patent: Assess patency of urinary catheter     Care plan reviewed with patient. Patient verbalizes understanding of the plan of care and contribute to goal setting.

## 2023-10-27 ENCOUNTER — APPOINTMENT (OUTPATIENT)
Dept: GENERAL RADIOLOGY | Age: 63
DRG: 551 | End: 2023-10-27
Attending: ORTHOPAEDIC SURGERY
Payer: COMMERCIAL

## 2023-10-27 PROCEDURE — 72100 X-RAY EXAM L-S SPINE 2/3 VWS: CPT

## 2023-10-27 PROCEDURE — 97535 SELF CARE MNGMENT TRAINING: CPT

## 2023-10-27 PROCEDURE — 6370000000 HC RX 637 (ALT 250 FOR IP): Performed by: PHYSICIAN ASSISTANT

## 2023-10-27 PROCEDURE — 97530 THERAPEUTIC ACTIVITIES: CPT

## 2023-10-27 PROCEDURE — 1200000000 HC SEMI PRIVATE

## 2023-10-27 PROCEDURE — 6360000002 HC RX W HCPCS: Performed by: PHYSICIAN ASSISTANT

## 2023-10-27 RX ORDER — CYCLOBENZAPRINE HCL 10 MG
10 TABLET ORAL 3 TIMES DAILY
Status: DISCONTINUED | OUTPATIENT
Start: 2023-10-27 | End: 2023-11-03 | Stop reason: HOSPADM

## 2023-10-27 RX ADMIN — MORPHINE SULFATE 4 MG: 4 INJECTION, SOLUTION INTRAMUSCULAR; INTRAVENOUS at 14:38

## 2023-10-27 RX ADMIN — OXYCODONE AND ACETAMINOPHEN 2 TABLET: 5; 325 TABLET ORAL at 11:47

## 2023-10-27 RX ADMIN — RIVAROXABAN 10 MG: 10 TABLET, FILM COATED ORAL at 18:32

## 2023-10-27 RX ADMIN — GABAPENTIN 600 MG: 300 CAPSULE ORAL at 20:56

## 2023-10-27 RX ADMIN — GABAPENTIN 600 MG: 300 CAPSULE ORAL at 11:50

## 2023-10-27 RX ADMIN — DIAZEPAM 5 MG: 5 TABLET ORAL at 11:46

## 2023-10-27 RX ADMIN — CYCLOBENZAPRINE 10 MG: 10 TABLET, FILM COATED ORAL at 20:58

## 2023-10-27 RX ADMIN — DIAZEPAM 5 MG: 5 TABLET ORAL at 17:36

## 2023-10-27 ASSESSMENT — PAIN SCALES - WONG BAKER
WONGBAKER_NUMERICALRESPONSE: 6

## 2023-10-27 ASSESSMENT — PAIN SCALES - GENERAL
PAINLEVEL_OUTOF10: 7
PAINLEVEL_OUTOF10: 6
PAINLEVEL_OUTOF10: 10
PAINLEVEL_OUTOF10: 3
PAINLEVEL_OUTOF10: 3
PAINLEVEL_OUTOF10: 0

## 2023-10-27 ASSESSMENT — PAIN DESCRIPTION - LOCATION
LOCATION: LEG

## 2023-10-27 ASSESSMENT — PAIN DESCRIPTION - DESCRIPTORS
DESCRIPTORS: SPASM;SHARP
DESCRIPTORS: ACHING
DESCRIPTORS: BURNING

## 2023-10-27 ASSESSMENT — PAIN DESCRIPTION - PAIN TYPE: TYPE: ACUTE PAIN

## 2023-10-27 ASSESSMENT — PAIN DESCRIPTION - ONSET: ONSET: ON-GOING

## 2023-10-27 ASSESSMENT — PAIN DESCRIPTION - ORIENTATION
ORIENTATION: LEFT

## 2023-10-27 ASSESSMENT — PAIN DESCRIPTION - FREQUENCY: FREQUENCY: INTERMITTENT

## 2023-10-27 ASSESSMENT — PAIN - FUNCTIONAL ASSESSMENT
PAIN_FUNCTIONAL_ASSESSMENT: PREVENTS OR INTERFERES SOME ACTIVE ACTIVITIES AND ADLS
PAIN_FUNCTIONAL_ASSESSMENT: PREVENTS OR INTERFERES SOME ACTIVE ACTIVITIES AND ADLS
PAIN_FUNCTIONAL_ASSESSMENT: ACTIVITIES ARE NOT PREVENTED

## 2023-10-27 NOTE — PROGRESS NOTES
[facility]  INPATIENT PHYSICAL THERAPY  DAILY NOTE  [facility] ORTHOPEDICS 7K - 7K-12/012-A    Time In: 0100  Time Out: 1520  Timed Code Treatment Minutes: 45 Minutes  Minutes: 45          Date: 10/27/2023  Patient Name: Juliette Tijerina,  Gender:  female        MRN: [id number]  : 1960  (61 y.o.)     Referring Practitioner: AUBREY Barrientos CNP  Diagnosis: Lumbar compression fracture, closed, initial encounter  Additional Pertinent Hx: Per EMr \"61 y.o. female who presents as a transfer from [facility] for further evaluation of severe hip and left leg radicular pain, lumbar pain. Underwent R DA PITO with Dr Lisa Macedo 10/19/23, noted significant low back and hip pain which increased with activity in post operative therapy course. She was discharged and later returned to the ED due to lack of pain control and inability to ambulate. Imaging revealed changes at C3, L5-S1, transfer was initiated for further evaluation by spine team. Her right hip pain is well controlled at this time, paresthesias predominately at the LLE and L low back. No bowel or bladder disturbances. Does admit to some weakness overall post operatively. \"     Prior Level of Function:  Lives With: Spouse  Type of Home: House  Home Layout: Two level, Able to Live on Main level with bedroom/bathroom, Performs ADL's on one level  Home Equipment: Tus[place] Carl, standard, [employer] (has wheels for walker)   Bathroom Shower/Tub: Tub/Shower unit  Bathroom Toilet: Standard  Bathroom Equipment:  (has been sponge bathing)    ADL Assistance: [id number] Independent  Homemaking Responsibilities: Yes  Ambulation Assistance: Independent  Transfer Assistance: Independent  Active : Yes  Additional Comments: Typically indep withunctional mobility using cane or cart while at work. Does not use AD when in the house. Typically indep with ADL and IADLs.     Restrictions/Precautions:  Restrictions/Precautions: Fall Risk, General Precautions  Required Braces or Orthoses  Spinal: Lumbar Corset  Spinal Other: hard LSO to be worn with all activity and when out of bed  Position Activity Restriction  Spinal Precautions: No Bending, No Lifting, No Twisting  Hip Precautions: Anterior hip precautions  Other position/activity restrictions: non-surgical as of date of eval-monitor upcoming imaging for stability of lumbar region. R PITO 10/19 by  Dr Tish Arrington. Left sided sciatica like pain     SUBJECTIVE: RN approved session, cotx with OT, pt assisted to/from radiology to obtain xrays. PAIN: 10/10: pt received all scheduled pain meds today    Vitals: Vitals not assessed per clinical judgement, see nursing flowsheet    OBJECTIVE:  Bed Mobility:  Rolling to Left: Minimal Assistance, X 1, with head of bed flat, with rail, with verbal cues , with increased time for completion   Rolling to Right: Minimal Assistance, X 1, with head of bed flat, with rail, with verbal cues , with increased time for completion   Supine to Sit: Moderate Assistance, X 1, X 2, with head of bed flat, with rail, with verbal cues , with increased time for completion  Sit to Supine: Minimal Assistance, X 1, with head of bed flat, with rail, with verbal cues , with increased time for completion     Transfers:  Sit to Stand: Minimal Assistance, X 2, with increased time for completion, with verbal cues; with SW from EOB  Stand to [address], X 2, with increased time for completion, with verbal cues    Ambulation:  Minimal Assistance, X 2  Distance: 2 feet forward/retro  Surface: Level Tile  Device:Standard Walker  Gait Deviations:   Forward Flexed Posture, Decreased Step Length Bilaterally, Decreased Weight Shift Bilaterally, Decreased Gait Speed, Decreased Heel Strike Bilaterally, and Decreased Terminal Knee Extension  **Pt stands ~3 minutes total, able to take a couple steps forward, then sideways and back to bed for xray    Balance:  Static Sitting Balance:

## 2023-10-27 NOTE — CARE COORDINATION
DISCHARGE PLANNING EVALUATION  10/27/23, 2:06 PM EDT    Reason for Referral: discharge plan  Mental Status: alert, oriented   Decision Making: makes own decisions   Family/Social/Home Environment:  patient lives at home with her , who can provide some support at home. She has been independent prior to admission  Current Services including food security, transportation and housekeeping: no current services or concerns  Current Equipment: none   Payment Source: [employer]  Concerns or Barriers to Discharge: unsure of discharge needs yet  Post-acute [facility]) provider list was provided to patient. Patient was informed of their freedom to choose [facility] provider. Discussed and offered to show the patient the relevant [facility] Providers quality and resource use measures on Medicare Compare web site via computer based on patient's goals of care and treatment preferences. Questions regarding selection process were answered. Teach Back Method used with patient regarding care plan and discharge plan  Patient  verbalized understanding of the plan of care and contributed to goal setting. Patient goals, treatment preferences and discharge plan:  spoke with patient about discharge plan. She is considering [address], but is unsure of plan yet.        Electronically signed by BRIAN Quigley on 10/27/2023 at 2:06 PM

## 2023-10-27 NOTE — CARE COORDINATION
10/27/23, 3:42 PM EDT    DISCHARGE ON GOING [address] day: 3  Location: -12/012-A Reason for admit: Lumbar compression fracture, closed, initial encounter ([address]) [R00.698Y]    S/p total R Hip replacement 10/19/23 (pta). MRI from OSH 10/23: widening of the  L5-S1 disk space and left neural foraminal narrowing. 10/27 L/S xray with brace on: Bones are diffusely demineralized, consistent with osteoporosis. Mild thoracolumbar levoscoliosis. Cannot exclude muscle spasm right side. No fracture seen. Disc spaces well-maintained. Question degenerative changes both sacroiliac joints which are not well demonstrated on this study. Barriers to Discharge: MS and Percocet. Gabapentin, WBAT LLE, TLSO for when OOB. Cont therapy , added Flexeril and Valium. + watery BM 10/26 with enema. Miralax and Senna. Stop Neurontin and Norco.  Patient Goals/Plan/Treatment Preferences: Enrique Nelson is from home; not a candidate for IP rehab and refused ecf. Considering [facility]. SW follows also.

## 2023-10-27 NOTE — PROGRESS NOTES
[address] [facility]  Occupational Therapy  Daily Note  Time:   Time In:   Time Out: 1520  Timed Code Treatment Minutes: 45 Minutes  Minutes: 45          Date: 10/27/2023  Patient Name: Scarlett Valera,   Gender: female      Room: [facility]-  MRN: [id number]  : 1960  (61 y.o.)  Referring Practitioner: AUBREY Jain CNP  Diagnosis: lumbar compression fracture, closed initial encounter  Additional Pertinent Hx: 61 y.o. female who presents as a transfer from [facility] for further evaluation of severe hip and left leg radicular pain, lumbar pain. Underwent R DA PITO with Dr Amaury Sher 10/19/23, noted significant low back and hip pain which increased with activity in post operative therapy course. She was discharged and later returned to the ED due to lack of pain control and inability to ambulate. Imaging revealed changes at C3, L5-S1, transfer was initiated for further evaluation by spine team. MRI of the lumbar spine completed on the date of 10/23/2023 at [facility] again shows anterolisthesis of L5-S1, left foraminal  stenosis due to encroachment by disk bulge and facet arthropathy and  widening of the disk space at L5-S1 as well. No surgical intervention warranted at this immediate time. LSO to be worn when up. Restrictions/Precautions:  Restrictions/Precautions: Fall Risk, General Precautions  Required Braces or Orthoses  Spinal: Lumbar Corset  Spinal Other: hard LSO to be worn with all activity and when out of bed  Position Activity Restriction  Spinal Precautions: No Bending, No Lifting, No Twisting  Hip Precautions: Anterior hip precautions  Other position/activity restrictions: non-surgical as of date of eval-monitor upcoming imaging for stability of lumbar region. R PITO 10/19 by  Dr Amaury Sher. Left sided sciatica like pain     SUBJECTIVE: Nurse approved OT treatment.  Per orders, pt in need of standing X-rays but unable to tolerate standing the [facility])  Obstetrics  Postpartum Progress Note    Patient Name: Betty Felipe  MRN: [id number]  Admission Date: 2023  Hospital Length of Stay: 5 days  Attending Physician: Ramona Madera MD  Primary Care Provider: Nay, Primary Doctor    Subjective:     Principal Problem:S/P primary low transverse     Hospital Course:  Cytotec IOL   : Doing ok this morning, rates pain 9/10 and would like an epidural but refusing a cervical exam. Discussed potential for cervix to still be unripe and discussed different pain medication options rather than epidural. Reviewed r/b/a, patient would like to move forward with epidural placement.     Patient ultimately underwent primary  for failure to progress without complication.  Transferred to mother baby unit for routine post partum care. Patient progressed well postoperatively without complication.          Interval History:   Pod #3    She is doing well this morning. She is tolerating a regular diet without nausea or vomiting. She is voiding spontaneously. She is ambulating. She has passed flatus, and has a BM. Vaginal bleeding is mild. She denies fever or chills. Abdominal pain is mild and controlled with oral medications. She Is breastfeeding.   Objective:     Vital Signs (Most Recent):  Temp: 98.7 °F (37.1 °C) (23 0757)  Pulse: 69 (23 0757)  Resp: 18 (23 0757)  BP: 111/66 (23 0757)  SpO2: 99 % (23 0005) Vital Signs (24h Range):  Temp:  [98.2 °F (36.8 °C)-98.9 °F (37.2 °C)] 98.7 °F (37.1 °C)  Pulse:  [69-83] 69  Resp:  [16-18] 18  SpO2:  [98 %-99 %] 99 %  BP: ()/(52-70) 111/66     Weight: 73.7 kg (162 lb 7.7 oz)  Body mass index is 30.7 kg/m².    No intake or output data in the 24 hours ending 23 0815      Significant Labs:  Lab Results   Component Value Date    GROUPTRH B POS 2023    STREPBCULT (A) 2023     STREPTOCOCCUS AGALACTIAE (GROUP B)  In case of Penicillin allergy, call  "lab for further testing.  Beta-hemolytic streptococci are routinely susceptible to   penicillins,cephalosporins and carbapenems.       No results for input(s): "HGB", "HCT" in the last 48 hours.    I have personallly reviewed all pertinent lab results from the last 24 hours.  Recent Lab Results       None            Physical Exam:   Constitutional: She is oriented to person, place, and time. She appears well-developed.    HENT:   Head: Normocephalic.    Eyes: Pupils are equal, round, and reactive to light.     Cardiovascular:  Normal rate and regular rhythm.             Pulmonary/Chest: Effort normal and breath sounds normal.        Abdominal: Soft. Bowel sounds are normal. She exhibits no abdominal incision (aquasel intact).     Genitourinary:    Genitourinary Comments: Ut--firm, non tender             Musculoskeletal: Normal range of motion and moves all extremeties. Edema (1+) present.       Neurological: She is alert and oriented to person, place, and time. She has normal reflexes.    Skin: Skin is warm and dry.    Psychiatric: She has a normal mood and affect. Her behavior is normal. Judgment and thought content normal.       Review of Systems   All other systems reviewed and are negative.      Assessment/Plan:     38 y.o. female  for:    * S/P primary low transverse   POD #2 s/p primary LTCS  Pt doing well, afebrile overnight. Change dressing today. Proceed with routine post-partum care, encouraged ambulation, aware ok to shower.  Pt counseled on management plan and discharge goals. Anticipate discharge in 1-2 days  2023  Pod #2 s/p primary c/section  Afebrile  Tolerating diet, +ambulation, +void  Pain controlled  Stable for discharge                Disposition: stable for discharge  Tosha Cardona MD  Obstetrics  [facility])      "

## 2023-10-27 NOTE — PLAN OF CARE
Problem: Discharge Planning  Goal: Discharge to home or other facility with appropriate resources  Outcome: Progressing  Flowsheets (Taken 10/26/2023 0517)  Discharge to home or other facility with appropriate resources: Identify barriers to discharge with patient and caregiver     Problem: Skin/Tissue Integrity  Goal: Absence of new skin breakdown  Description: 1. Monitor for areas of redness and/or skin breakdown  2. Assess vascular access sites hourly  3. Every 4-6 hours minimum:  Change oxygen saturation probe site  4. Every 4-6 hours:  If on nasal continuous positive airway pressure, respiratory therapy assess nares and determine need for appliance change or resting period.   Outcome: Progressing     Problem: Safety - Adult  Goal: Free from fall injury  Outcome: Progressing  Flowsheets (Taken 10/26/2023 0517)  Free From Fall Injury: Instruct family/caregiver on patient safety     Problem: Pain  Goal: Verbalizes/displays adequate comfort level or baseline comfort level  Outcome: Progressing  Flowsheets (Taken 10/26/2023 0517)  Verbalizes/displays adequate comfort level or baseline comfort level:   Encourage patient to monitor pain and request assistance   Assess pain using appropriate pain scale   Administer analgesics based on type and severity of pain and evaluate response   Implement non-pharmacological measures as appropriate and evaluate response   Notify Licensed Independent Practitioner if interventions unsuccessful or patient reports new pain     Problem: Respiratory - Adult  Goal: Achieves optimal ventilation and oxygenation  Outcome: Progressing  Flowsheets (Taken 10/26/2023 0517)  Achieves optimal ventilation and oxygenation:   Assess for changes in respiratory status   Assess for changes in mentation and behavior   Position to facilitate oxygenation and minimize respiratory effort   Oxygen supplementation based on oxygen saturation or arterial blood gases   Encourage broncho-pulmonary hygiene

## 2023-10-28 ENCOUNTER — APPOINTMENT (OUTPATIENT)
Dept: GENERAL RADIOLOGY | Age: 63
DRG: 551 | End: 2023-10-28
Attending: ORTHOPAEDIC SURGERY
Payer: COMMERCIAL

## 2023-10-28 PROBLEM — J96.01 ACUTE HYPOXIC RESPIRATORY FAILURE (HCC): Status: ACTIVE | Noted: 2023-10-28

## 2023-10-28 LAB
ALBUMIN SERPL BCG-MCNC: 3.1 G/DL (ref 3.5–5.1)
ALP SERPL-CCNC: 209 U/L (ref 38–126)
ALT SERPL W/O P-5'-P-CCNC: 20 U/L (ref 11–66)
ANION GAP SERPL CALC-SCNC: 12 MEQ/L (ref 8–16)
AST SERPL-CCNC: 21 U/L (ref 5–40)
BASOPHILS ABSOLUTE: 0 THOU/MM3 (ref 0–0.1)
BASOPHILS NFR BLD AUTO: 0.2 %
BILIRUB CONJ SERPL-MCNC: < 0.2 MG/DL (ref 0–0.3)
BILIRUB SERPL-MCNC: 0.5 MG/DL (ref 0.3–1.2)
BUN SERPL-MCNC: 17 MG/DL (ref 7–22)
CALCIUM SERPL-MCNC: 9 MG/DL (ref 8.5–10.5)
CHLORIDE SERPL-SCNC: 98 MEQ/L (ref 98–111)
CO2 SERPL-SCNC: 24 MEQ/L (ref 23–33)
CREAT SERPL-MCNC: 0.6 MG/DL (ref 0.4–1.2)
DEPRECATED RDW RBC AUTO: 48.4 FL (ref 35–45)
EKG ATRIAL RATE: 98 BPM
EKG P AXIS: -5 DEGREES
EKG P-R INTERVAL: 134 MS
EKG Q-T INTERVAL: 348 MS
EKG QRS DURATION: 84 MS
EKG QTC CALCULATION (BAZETT): 444 MS
EKG R AXIS: -53 DEGREES
EKG T AXIS: 12 DEGREES
EKG VENTRICULAR RATE: 98 BPM
EOSINOPHIL NFR BLD AUTO: 2.4 %
EOSINOPHILS ABSOLUTE: 0.2 THOU/MM3 (ref 0–0.4)
ERYTHROCYTE [DISTWIDTH] IN BLOOD BY AUTOMATED COUNT: 14.3 % (ref 11.5–14.5)
GFR SERPL CREATININE-BSD FRML MDRD: > 60 ML/MIN/1.73M2
GLUCOSE SERPL-MCNC: 117 MG/DL (ref 70–108)
HCT VFR BLD AUTO: 36.9 % (ref 37–47)
HGB BLD-MCNC: 12.2 GM/DL (ref 12–16)
IMM GRANULOCYTES # BLD AUTO: 0.08 THOU/MM3 (ref 0–0.07)
IMM GRANULOCYTES NFR BLD AUTO: 0.8 %
LACTATE SERPL-SCNC: 1.1 MMOL/L (ref 0.5–2)
LYMPHOCYTES ABSOLUTE: 1.2 THOU/MM3 (ref 1–4.8)
LYMPHOCYTES NFR BLD AUTO: 12 %
MCH RBC QN AUTO: 31.3 PG (ref 26–33)
MCHC RBC AUTO-ENTMCNC: 33.1 GM/DL (ref 32.2–35.5)
MCV RBC AUTO: 94.6 FL (ref 81–99)
MONOCYTES ABSOLUTE: 0.7 THOU/MM3 (ref 0.4–1.3)
MONOCYTES NFR BLD AUTO: 7.1 %
NEUTROPHILS NFR BLD AUTO: 77.5 %
NRBC BLD AUTO-RTO: 0 /100 WBC
PLATELET # BLD AUTO: 306 THOU/MM3 (ref 130–400)
PMV BLD AUTO: 11 FL (ref 9.4–12.4)
POTASSIUM SERPL-SCNC: 4.1 MEQ/L (ref 3.5–5.2)
PROCALCITONIN SERPL IA-MCNC: 0.1 NG/ML (ref 0.01–0.09)
PROT SERPL-MCNC: 6.7 G/DL (ref 6.1–8)
RBC # BLD AUTO: 3.9 MILL/MM3 (ref 4.2–5.4)
SEGMENTED NEUTROPHILS ABSOLUTE COUNT: 8 THOU/MM3 (ref 1.8–7.7)
SODIUM SERPL-SCNC: 134 MEQ/L (ref 135–145)
WBC # BLD AUTO: 10.3 THOU/MM3 (ref 4.8–10.8)

## 2023-10-28 PROCEDURE — 6370000000 HC RX 637 (ALT 250 FOR IP): Performed by: PHYSICIAN ASSISTANT

## 2023-10-28 PROCEDURE — 99222 1ST HOSP IP/OBS MODERATE 55: CPT | Performed by: INTERNAL MEDICINE

## 2023-10-28 PROCEDURE — 94760 N-INVAS EAR/PLS OXIMETRY 1: CPT

## 2023-10-28 PROCEDURE — 93005 ELECTROCARDIOGRAM TRACING: CPT | Performed by: STUDENT IN AN ORGANIZED HEALTH CARE EDUCATION/TRAINING PROGRAM

## 2023-10-28 PROCEDURE — 83605 ASSAY OF LACTIC ACID: CPT

## 2023-10-28 PROCEDURE — 85025 COMPLETE CBC W/AUTO DIFF WBC: CPT

## 2023-10-28 PROCEDURE — 6370000000 HC RX 637 (ALT 250 FOR IP): Performed by: NURSE PRACTITIONER

## 2023-10-28 PROCEDURE — 2700000000 HC OXYGEN THERAPY PER DAY

## 2023-10-28 PROCEDURE — 36415 COLL VENOUS BLD VENIPUNCTURE: CPT

## 2023-10-28 PROCEDURE — 71045 X-RAY EXAM CHEST 1 VIEW: CPT

## 2023-10-28 PROCEDURE — 93010 ELECTROCARDIOGRAM REPORT: CPT | Performed by: INTERNAL MEDICINE

## 2023-10-28 PROCEDURE — 80053 COMPREHEN METABOLIC PANEL: CPT

## 2023-10-28 PROCEDURE — 84145 PROCALCITONIN (PCT): CPT

## 2023-10-28 PROCEDURE — 1200000000 HC SEMI PRIVATE

## 2023-10-28 PROCEDURE — 94669 MECHANICAL CHEST WALL OSCILL: CPT

## 2023-10-28 PROCEDURE — 82248 BILIRUBIN DIRECT: CPT

## 2023-10-28 RX ORDER — MORPHINE SULFATE 2 MG/ML
2 INJECTION, SOLUTION INTRAMUSCULAR; INTRAVENOUS
Status: DISCONTINUED | OUTPATIENT
Start: 2023-10-28 | End: 2023-11-03 | Stop reason: HOSPADM

## 2023-10-28 RX ORDER — PANTOPRAZOLE SODIUM 40 MG/1
40 TABLET, DELAYED RELEASE ORAL
Status: DISCONTINUED | OUTPATIENT
Start: 2023-10-29 | End: 2023-11-03 | Stop reason: HOSPADM

## 2023-10-28 RX ORDER — MORPHINE SULFATE 2 MG/ML
1 INJECTION, SOLUTION INTRAMUSCULAR; INTRAVENOUS
Status: DISCONTINUED | OUTPATIENT
Start: 2023-10-28 | End: 2023-11-03 | Stop reason: HOSPADM

## 2023-10-28 RX ADMIN — SENNOSIDES 17.2 MG: 8.6 TABLET, FILM COATED ORAL at 21:28

## 2023-10-28 RX ADMIN — GABAPENTIN 600 MG: 300 CAPSULE ORAL at 15:36

## 2023-10-28 RX ADMIN — POLYETHYLENE GLYCOL (3350) 17 G: 17 POWDER, FOR SOLUTION ORAL at 11:32

## 2023-10-28 RX ADMIN — CYCLOBENZAPRINE 10 MG: 10 TABLET, FILM COATED ORAL at 21:29

## 2023-10-28 RX ADMIN — GABAPENTIN 600 MG: 300 CAPSULE ORAL at 21:29

## 2023-10-28 RX ADMIN — CYCLOBENZAPRINE 10 MG: 10 TABLET, FILM COATED ORAL at 11:22

## 2023-10-28 RX ADMIN — RIVAROXABAN 10 MG: 10 TABLET, FILM COATED ORAL at 17:58

## 2023-10-28 RX ADMIN — DOCUSATE SODIUM 100 MG: 100 CAPSULE, LIQUID FILLED ORAL at 11:33

## 2023-10-28 RX ADMIN — GABAPENTIN 600 MG: 300 CAPSULE ORAL at 11:32

## 2023-10-28 RX ADMIN — OXYCODONE AND ACETAMINOPHEN 2 TABLET: 5; 325 TABLET ORAL at 11:22

## 2023-10-28 RX ADMIN — CYCLOBENZAPRINE 10 MG: 10 TABLET, FILM COATED ORAL at 15:36

## 2023-10-28 RX ADMIN — DOCUSATE SODIUM 100 MG: 100 CAPSULE, LIQUID FILLED ORAL at 21:29

## 2023-10-28 ASSESSMENT — PAIN DESCRIPTION - DESCRIPTORS
DESCRIPTORS: SQUEEZING;SHOOTING
DESCRIPTORS: SHOOTING;SQUEEZING

## 2023-10-28 ASSESSMENT — PAIN SCALES - GENERAL
PAINLEVEL_OUTOF10: 8
PAINLEVEL_OUTOF10: 8
PAINLEVEL_OUTOF10: 2

## 2023-10-28 ASSESSMENT — PAIN DESCRIPTION - ONSET
ONSET: ON-GOING
ONSET: ON-GOING

## 2023-10-28 ASSESSMENT — PAIN - FUNCTIONAL ASSESSMENT
PAIN_FUNCTIONAL_ASSESSMENT: ACTIVITIES ARE NOT PREVENTED
PAIN_FUNCTIONAL_ASSESSMENT: ACTIVITIES ARE NOT PREVENTED

## 2023-10-28 ASSESSMENT — PAIN DESCRIPTION - ORIENTATION
ORIENTATION: LEFT
ORIENTATION: LEFT

## 2023-10-28 ASSESSMENT — PAIN DESCRIPTION - LOCATION
LOCATION: KNEE;OTHER (COMMENT)
LOCATION: KNEE

## 2023-10-28 ASSESSMENT — PAIN DESCRIPTION - FREQUENCY
FREQUENCY: CONTINUOUS
FREQUENCY: CONTINUOUS

## 2023-10-28 ASSESSMENT — PAIN DESCRIPTION - PAIN TYPE
TYPE: ACUTE PAIN
TYPE: ACUTE PAIN

## 2023-10-28 NOTE — PLAN OF CARE
Problem: Discharge Planning  Goal: Discharge to home or other facility with appropriate resources  Outcome: Progressing  Flowsheets (Taken 10/28/2023 0120 by Mellisa Quintero RN)  Discharge to home or other facility with appropriate resources:   Identify barriers to discharge with patient and caregiver   Identify discharge learning needs (meds, wound care, etc)   Arrange for needed discharge resources and transportation as appropriate   Arrange for interpreters to assist at discharge as needed   Refer to discharge planning if patient needs post-hospital services based on physician order or complex needs related to functional status, cognitive ability or social support system     Problem: Skin/Tissue Integrity  Goal: Absence of new skin breakdown  Description: 1. Monitor for areas of redness and/or skin breakdown  2. Assess vascular access sites hourly  3. Every 4-6 hours minimum:  Change oxygen saturation probe site  4. Every 4-6 hours:  If on nasal continuous positive airway pressure, respiratory therapy assess nares and determine need for appliance change or resting period.   Outcome: Progressing  Note: No new skin breakdown this shift     Problem: Safety - Adult  Goal: Free from fall injury  Outcome: Progressing  Flowsheets (Taken 10/28/2023 0123 by Mellisa Quintero RN)  Free From Fall Injury: Instruct family/caregiver on patient safety     Problem: Pain  Goal: Verbalizes/displays adequate comfort level or baseline comfort level  Outcome: Progressing  Flowsheets (Taken 10/28/2023 0120 by Mellisa Quintero RN)  Verbalizes/displays adequate comfort level or baseline comfort level:   Encourage patient to monitor pain and request assistance   Assess pain using appropriate pain scale   Administer analgesics based on type and severity of pain and evaluate response   Implement non-pharmacological measures as appropriate and evaluate response   Consider cultural and social influences on pain and pain

## 2023-10-29 ENCOUNTER — APPOINTMENT (OUTPATIENT)
Dept: CT IMAGING | Age: 63
DRG: 551 | End: 2023-10-29
Attending: ORTHOPAEDIC SURGERY
Payer: COMMERCIAL

## 2023-10-29 ENCOUNTER — APPOINTMENT (OUTPATIENT)
Dept: GENERAL RADIOLOGY | Age: 63
DRG: 551 | End: 2023-10-29
Attending: ORTHOPAEDIC SURGERY
Payer: COMMERCIAL

## 2023-10-29 PROBLEM — R09.02 HYPOXIA: Status: ACTIVE | Noted: 2023-10-29

## 2023-10-29 PROBLEM — J98.11 ATELECTASIS PULMONARY: Status: ACTIVE | Noted: 2023-10-29

## 2023-10-29 LAB
ALBUMIN SERPL BCG-MCNC: 3.3 G/DL (ref 3.5–5.1)
ALP SERPL-CCNC: 217 U/L (ref 38–126)
ALT SERPL W/O P-5'-P-CCNC: 15 U/L (ref 11–66)
ANION GAP SERPL CALC-SCNC: 13 MEQ/L (ref 8–16)
ANION GAP SERPL CALC-SCNC: 14 MEQ/L (ref 8–16)
ARTERIAL PATENCY WRIST A: ABNORMAL
ARTERIAL PATENCY WRIST A: ABNORMAL
ARTERIAL PATENCY WRIST A: POSITIVE
ARTERIAL PATENCY WRIST A: POSITIVE
AST SERPL-CCNC: 21 U/L (ref 5–40)
BASE EXCESS BLDA CALC-SCNC: -0.4 MMOL/L (ref -2.5–2.5)
BASE EXCESS BLDA CALC-SCNC: 0.2 MMOL/L (ref -2.5–2.5)
BASE EXCESS BLDA CALC-SCNC: 0.4 MMOL/L (ref -2.5–2.5)
BASE EXCESS BLDA CALC-SCNC: 2.5 MMOL/L (ref -2.5–2.5)
BASOPHILS ABSOLUTE: 0 THOU/MM3 (ref 0–0.1)
BASOPHILS NFR BLD AUTO: 0.3 %
BDY SITE: ABNORMAL
BILIRUB SERPL-MCNC: 0.4 MG/DL (ref 0.3–1.2)
BUN SERPL-MCNC: 17 MG/DL (ref 7–22)
BUN SERPL-MCNC: 19 MG/DL (ref 7–22)
CALCIUM SERPL-MCNC: 8.8 MG/DL (ref 8.5–10.5)
CALCIUM SERPL-MCNC: 9 MG/DL (ref 8.5–10.5)
CHLORIDE SERPL-SCNC: 97 MEQ/L (ref 98–111)
CHLORIDE SERPL-SCNC: 98 MEQ/L (ref 98–111)
CO2 SERPL-SCNC: 24 MEQ/L (ref 23–33)
CO2 SERPL-SCNC: 24 MEQ/L (ref 23–33)
COLLECTED BY:: ABNORMAL
CREAT SERPL-MCNC: 0.6 MG/DL (ref 0.4–1.2)
CREAT SERPL-MCNC: 0.7 MG/DL (ref 0.4–1.2)
DEPRECATED RDW RBC AUTO: 49.7 FL (ref 35–45)
DEPRECATED RDW RBC AUTO: 50.4 FL (ref 35–45)
DEVICE: ABNORMAL
EOSINOPHIL NFR BLD AUTO: 0.6 %
EOSINOPHILS ABSOLUTE: 0.1 THOU/MM3 (ref 0–0.4)
ERYTHROCYTE [DISTWIDTH] IN BLOOD BY AUTOMATED COUNT: 14.3 % (ref 11.5–14.5)
ERYTHROCYTE [DISTWIDTH] IN BLOOD BY AUTOMATED COUNT: 14.6 % (ref 11.5–14.5)
FIO2 ON VENT O2 ANALYZER: 100 %
FIO2 ON VENT O2 ANALYZER: 100 %
FIO2 ON VENT O2 ANALYZER: 50 %
FIO2 ON VENT O2 ANALYZER: 95 %
GFR SERPL CREATININE-BSD FRML MDRD: > 60 ML/MIN/1.73M2
GFR SERPL CREATININE-BSD FRML MDRD: > 60 ML/MIN/1.73M2
GLUCOSE BLD-MCNC: 95 MG/DL (ref 70–108)
GLUCOSE SERPL-MCNC: 103 MG/DL (ref 70–108)
GLUCOSE SERPL-MCNC: 115 MG/DL (ref 70–108)
HCO3 BLDA-SCNC: 19 MMOL/L (ref 23–28)
HCO3 BLDA-SCNC: 23 MMOL/L (ref 23–28)
HCO3 BLDA-SCNC: 23 MMOL/L (ref 23–28)
HCO3 BLDA-SCNC: 25 MMOL/L (ref 23–28)
HCT VFR BLD AUTO: 34 % (ref 37–47)
HCT VFR BLD AUTO: 35.7 % (ref 37–47)
HGB BLD-MCNC: 11 GM/DL (ref 12–16)
HGB BLD-MCNC: 11.6 GM/DL (ref 12–16)
IMM GRANULOCYTES # BLD AUTO: 0.05 THOU/MM3 (ref 0–0.07)
IMM GRANULOCYTES NFR BLD AUTO: 0.4 %
LACTATE SERPL-SCNC: 1.7 MMOL/L (ref 0.5–2)
LYMPHOCYTES ABSOLUTE: 1.2 THOU/MM3 (ref 1–4.8)
LYMPHOCYTES NFR BLD AUTO: 10.4 %
MAGNESIUM SERPL-MCNC: 2.1 MG/DL (ref 1.6–2.4)
MAGNESIUM SERPL-MCNC: 2.3 MG/DL (ref 1.6–2.4)
MCH RBC QN AUTO: 31.1 PG (ref 26–33)
MCH RBC QN AUTO: 31.3 PG (ref 26–33)
MCHC RBC AUTO-ENTMCNC: 32.4 GM/DL (ref 32.2–35.5)
MCHC RBC AUTO-ENTMCNC: 32.5 GM/DL (ref 32.2–35.5)
MCV RBC AUTO: 96 FL (ref 81–99)
MCV RBC AUTO: 96.2 FL (ref 81–99)
MONOCYTES ABSOLUTE: 0.8 THOU/MM3 (ref 0.4–1.3)
MONOCYTES NFR BLD AUTO: 6.9 %
MRSA DNA SPEC QL NAA+PROBE: NEGATIVE
NEUTROPHILS NFR BLD AUTO: 81.4 %
NRBC BLD AUTO-RTO: 0 /100 WBC
NT-PROBNP SERPL IA-MCNC: 199.5 PG/ML (ref 0–124)
PCO2 BLDA: 20 MMHG (ref 35–45)
PCO2 BLDA: 30 MMHG (ref 35–45)
PCO2 BLDA: 31 MMHG (ref 35–45)
PCO2 BLDA: 32 MMHG (ref 35–45)
PEEP SETTING VENT: 10 MMHG
PEEP SETTING VENT: 12 MMHG
PEEP SETTING VENT: 12 MMHG
PH BLDA: 7.47 [PH] (ref 7.35–7.45)
PH BLDA: 7.49 [PH] (ref 7.35–7.45)
PH BLDA: 7.52 [PH] (ref 7.35–7.45)
PH BLDA: 7.6 [PH] (ref 7.35–7.45)
PHOSPHATE SERPL-MCNC: 4.8 MG/DL (ref 2.4–4.7)
PIP: 14 CMH2O
PIP: 16 CMH2O
PIP: 16 CMH2O
PLATELET # BLD AUTO: 298 THOU/MM3 (ref 130–400)
PLATELET # BLD AUTO: 304 THOU/MM3 (ref 130–400)
PMV BLD AUTO: 11.1 FL (ref 9.4–12.4)
PMV BLD AUTO: 11.2 FL (ref 9.4–12.4)
PO2 BLDA: 160 MMHG (ref 71–104)
PO2 BLDA: 44 MMHG (ref 71–104)
PO2 BLDA: 60 MMHG (ref 71–104)
PO2 BLDA: 81 MMHG (ref 71–104)
POTASSIUM SERPL-SCNC: 4 MEQ/L (ref 3.5–5.2)
POTASSIUM SERPL-SCNC: 4.2 MEQ/L (ref 3.5–5.2)
PROCALCITONIN SERPL IA-MCNC: 0.09 NG/ML (ref 0.01–0.09)
PROCALCITONIN SERPL IA-MCNC: 0.09 NG/ML (ref 0.01–0.09)
PROT SERPL-MCNC: 6.5 G/DL (ref 6.1–8)
RBC # BLD AUTO: 3.54 MILL/MM3 (ref 4.2–5.4)
RBC # BLD AUTO: 3.71 MILL/MM3 (ref 4.2–5.4)
SAO2 % BLDA: 100 %
SAO2 % BLDA: 83 %
SAO2 % BLDA: 95 %
SAO2 % BLDA: 97 %
SEGMENTED NEUTROPHILS ABSOLUTE COUNT: 9.5 THOU/MM3 (ref 1.8–7.7)
SODIUM SERPL-SCNC: 134 MEQ/L (ref 135–145)
SODIUM SERPL-SCNC: 136 MEQ/L (ref 135–145)
TROPONIN, HIGH SENSITIVITY: 305 NG/L (ref 0–12)
TROPONIN, HIGH SENSITIVITY: 306 NG/L (ref 0–12)
VENTILATION MODE VENT: ABNORMAL
WBC # BLD AUTO: 11.7 THOU/MM3 (ref 4.8–10.8)
WBC # BLD AUTO: 11.8 THOU/MM3 (ref 4.8–10.8)

## 2023-10-29 PROCEDURE — 84145 PROCALCITONIN (PCT): CPT

## 2023-10-29 PROCEDURE — 6360000004 HC RX CONTRAST MEDICATION: Performed by: NURSE PRACTITIONER

## 2023-10-29 PROCEDURE — 99291 CRITICAL CARE FIRST HOUR: CPT | Performed by: INTERNAL MEDICINE

## 2023-10-29 PROCEDURE — 6370000000 HC RX 637 (ALT 250 FOR IP): Performed by: NURSE PRACTITIONER

## 2023-10-29 PROCEDURE — 84484 ASSAY OF TROPONIN QUANT: CPT

## 2023-10-29 PROCEDURE — 85027 COMPLETE CBC AUTOMATED: CPT

## 2023-10-29 PROCEDURE — 84100 ASSAY OF PHOSPHORUS: CPT

## 2023-10-29 PROCEDURE — 83605 ASSAY OF LACTIC ACID: CPT

## 2023-10-29 PROCEDURE — 2000000000 HC ICU R&B

## 2023-10-29 PROCEDURE — 5A09357 ASSISTANCE WITH RESPIRATORY VENTILATION, LESS THAN 24 CONSECUTIVE HOURS, CONTINUOUS POSITIVE AIRWAY PRESSURE: ICD-10-PCS | Performed by: INTERNAL MEDICINE

## 2023-10-29 PROCEDURE — 71275 CT ANGIOGRAPHY CHEST: CPT

## 2023-10-29 PROCEDURE — 87641 MR-STAPH DNA AMP PROBE: CPT

## 2023-10-29 PROCEDURE — 2700000000 HC OXYGEN THERAPY PER DAY

## 2023-10-29 PROCEDURE — 6370000000 HC RX 637 (ALT 250 FOR IP): Performed by: PHYSICIAN ASSISTANT

## 2023-10-29 PROCEDURE — 87086 URINE CULTURE/COLONY COUNT: CPT

## 2023-10-29 PROCEDURE — 94640 AIRWAY INHALATION TREATMENT: CPT

## 2023-10-29 PROCEDURE — 87077 CULTURE AEROBIC IDENTIFY: CPT

## 2023-10-29 PROCEDURE — 85025 COMPLETE CBC W/AUTO DIFF WBC: CPT

## 2023-10-29 PROCEDURE — 94669 MECHANICAL CHEST WALL OSCILL: CPT

## 2023-10-29 PROCEDURE — 6370000000 HC RX 637 (ALT 250 FOR IP)

## 2023-10-29 PROCEDURE — 82803 BLOOD GASES ANY COMBINATION: CPT

## 2023-10-29 PROCEDURE — 36415 COLL VENOUS BLD VENIPUNCTURE: CPT

## 2023-10-29 PROCEDURE — 80053 COMPREHEN METABOLIC PANEL: CPT

## 2023-10-29 PROCEDURE — 36600 WITHDRAWAL OF ARTERIAL BLOOD: CPT

## 2023-10-29 PROCEDURE — 6360000002 HC RX W HCPCS: Performed by: STUDENT IN AN ORGANIZED HEALTH CARE EDUCATION/TRAINING PROGRAM

## 2023-10-29 PROCEDURE — 83735 ASSAY OF MAGNESIUM: CPT

## 2023-10-29 PROCEDURE — 94761 N-INVAS EAR/PLS OXIMETRY MLT: CPT

## 2023-10-29 PROCEDURE — 94660 CPAP INITIATION&MGMT: CPT

## 2023-10-29 PROCEDURE — 71045 X-RAY EXAM CHEST 1 VIEW: CPT

## 2023-10-29 PROCEDURE — 6370000000 HC RX 637 (ALT 250 FOR IP): Performed by: STUDENT IN AN ORGANIZED HEALTH CARE EDUCATION/TRAINING PROGRAM

## 2023-10-29 PROCEDURE — 82947 ASSAY GLUCOSE BLOOD QUANT: CPT

## 2023-10-29 PROCEDURE — 87070 CULTURE OTHR SPECIMN AEROBIC: CPT

## 2023-10-29 PROCEDURE — 87186 SC STD MICRODIL/AGAR DIL: CPT

## 2023-10-29 PROCEDURE — 83880 ASSAY OF NATRIURETIC PEPTIDE: CPT

## 2023-10-29 RX ORDER — IPRATROPIUM BROMIDE AND ALBUTEROL SULFATE 2.5; .5 MG/3ML; MG/3ML
1 SOLUTION RESPIRATORY (INHALATION)
Status: DISCONTINUED | OUTPATIENT
Start: 2023-10-29 | End: 2023-10-29

## 2023-10-29 RX ORDER — IPRATROPIUM BROMIDE AND ALBUTEROL SULFATE 2.5; .5 MG/3ML; MG/3ML
1 SOLUTION RESPIRATORY (INHALATION)
Status: DISCONTINUED | OUTPATIENT
Start: 2023-10-29 | End: 2023-10-30

## 2023-10-29 RX ORDER — ALBUTEROL SULFATE 2.5 MG/3ML
2.5 SOLUTION RESPIRATORY (INHALATION) EVERY 4 HOURS PRN
Status: DISCONTINUED | OUTPATIENT
Start: 2023-10-29 | End: 2023-11-03 | Stop reason: HOSPADM

## 2023-10-29 RX ADMIN — GABAPENTIN 600 MG: 300 CAPSULE ORAL at 08:28

## 2023-10-29 RX ADMIN — IOPAMIDOL 80 ML: 755 INJECTION, SOLUTION INTRAVENOUS at 04:17

## 2023-10-29 RX ADMIN — DOCUSATE SODIUM 100 MG: 100 CAPSULE, LIQUID FILLED ORAL at 19:54

## 2023-10-29 RX ADMIN — IPRATROPIUM BROMIDE AND ALBUTEROL SULFATE 1 DOSE: .5; 3 SOLUTION RESPIRATORY (INHALATION) at 12:00

## 2023-10-29 RX ADMIN — CYCLOBENZAPRINE 10 MG: 10 TABLET, FILM COATED ORAL at 19:54

## 2023-10-29 RX ADMIN — MORPHINE SULFATE 1 MG: 2 INJECTION, SOLUTION INTRAMUSCULAR; INTRAVENOUS at 08:20

## 2023-10-29 RX ADMIN — OXYCODONE AND ACETAMINOPHEN 2 TABLET: 5; 325 TABLET ORAL at 10:13

## 2023-10-29 RX ADMIN — CYCLOBENZAPRINE 10 MG: 10 TABLET, FILM COATED ORAL at 08:20

## 2023-10-29 RX ADMIN — DOCUSATE SODIUM 100 MG: 100 CAPSULE, LIQUID FILLED ORAL at 10:13

## 2023-10-29 RX ADMIN — POLYETHYLENE GLYCOL (3350) 17 G: 17 POWDER, FOR SOLUTION ORAL at 10:13

## 2023-10-29 RX ADMIN — SENNOSIDES 17.2 MG: 8.6 TABLET, FILM COATED ORAL at 19:55

## 2023-10-29 RX ADMIN — PANTOPRAZOLE SODIUM 40 MG: 40 TABLET, DELAYED RELEASE ORAL at 05:18

## 2023-10-29 RX ADMIN — CYCLOBENZAPRINE 10 MG: 10 TABLET, FILM COATED ORAL at 15:30

## 2023-10-29 RX ADMIN — IPRATROPIUM BROMIDE AND ALBUTEROL SULFATE 1 DOSE: .5; 3 SOLUTION RESPIRATORY (INHALATION) at 07:47

## 2023-10-29 RX ADMIN — GABAPENTIN 600 MG: 300 CAPSULE ORAL at 19:55

## 2023-10-29 RX ADMIN — IPRATROPIUM BROMIDE AND ALBUTEROL SULFATE 1 DOSE: .5; 3 SOLUTION RESPIRATORY (INHALATION) at 02:55

## 2023-10-29 RX ADMIN — GABAPENTIN 600 MG: 300 CAPSULE ORAL at 15:30

## 2023-10-29 RX ADMIN — RIVAROXABAN 10 MG: 10 TABLET, FILM COATED ORAL at 19:54

## 2023-10-29 ASSESSMENT — PAIN SCALES - GENERAL
PAINLEVEL_OUTOF10: 0
PAINLEVEL_OUTOF10: 7
PAINLEVEL_OUTOF10: 2
PAINLEVEL_OUTOF10: 9
PAINLEVEL_OUTOF10: 5

## 2023-10-29 ASSESSMENT — PAIN DESCRIPTION - PAIN TYPE: TYPE: SURGICAL PAIN

## 2023-10-29 ASSESSMENT — PAIN - FUNCTIONAL ASSESSMENT: PAIN_FUNCTIONAL_ASSESSMENT: PREVENTS OR INTERFERES SOME ACTIVE ACTIVITIES AND ADLS

## 2023-10-29 ASSESSMENT — PAIN DESCRIPTION - LOCATION
LOCATION: BACK
LOCATION: BACK;HIP
LOCATION: HIP

## 2023-10-29 ASSESSMENT — PAIN DESCRIPTION - ONSET: ONSET: PROGRESSIVE

## 2023-10-29 ASSESSMENT — PAIN DESCRIPTION - DESCRIPTORS
DESCRIPTORS: ACHING
DESCRIPTORS: PRESSURE;SHARP
DESCRIPTORS: SPASM

## 2023-10-29 ASSESSMENT — PAIN DESCRIPTION - FREQUENCY
FREQUENCY: CONTINUOUS
FREQUENCY: CONTINUOUS

## 2023-10-29 ASSESSMENT — PAIN DESCRIPTION - ORIENTATION
ORIENTATION: RIGHT;LEFT
ORIENTATION: RIGHT;LEFT

## 2023-10-29 ASSESSMENT — PAIN SCALES - WONG BAKER: WONGBAKER_NUMERICALRESPONSE: 2

## 2023-10-29 NOTE — FLOWSHEET NOTE
10/29/23 6190   Safe Environment   Safety Measures Call light within reach; Family at bedside;Standard Safety Measures  (virtual safety round completed)     Virtual nurse completed safety round and notified bedside nurse on patient's IV machine beeping with family in the room; will follow up with patient later. Thank you.

## 2023-10-29 NOTE — SIGNIFICANT EVENT
Significant event note    RN notified resident that patient desaturating to upper 80s on 6 L NC. Patient examined, asymptomatic. End expiratory wheezing with some rhonchi left lower lobe. Patient noted to be mouth breather. Patient switched to NRB 15 L, SPO2 100%. Encouraged deep breathing which helped raise SPO2. Titrated from 15 L to 6 L, SPO2 94%. /89, HR 93. Ordered DuoNeb prn. Discussed patient's situation with RN at bedside, patient okay with NRB through the night. 7K floor has limit of 6 LPM, are okay with NRB. Half hour later, SPO2 87% on 15 L NRB. Transfer SDU, stat CXR LA CBC BMP, HHFNC. Patient discussed with RT over phone. /68, . In stepdown unit, RN notified resident SPO2 88-90 on 60 LPM HHFNC. Wells score 6, moderate risk score: PE #1 diagnosis, tachycardia, recent orthopedic surgery. Ordered stat CTA chest.  Kidney function at baseline. Patient on Xarelto 10 mg daily, first dose 10/24. Patient endorses left calf tenderness to palpation. CTA chest revealed no acute PE.  SPO2 100% 60 LPM FiO2, with intermittent dropping to upper 80s.       Electronically signed by Nikki Posadas DO on 10/29/2023 at 2:35 AM  Updated at 5:03am

## 2023-10-29 NOTE — PROGRESS NOTES
Department of Orthopedic Surgery  Spine Service    Progress Note        Subjective:    Elmira Hopper was seen this am laying flat in bed tearful. She reports significant pain in the low lack that radiates down the bilateral hips and posterior legs which is exacerbated by movement. She is post right hip replacement on 10/19/2023 by Dr. Lisa Macedo. MRI was completed of the lumbar spine which did show extension type soft tissue injury with a L5 pars defect the L5-S1 level. She has not been out of the bed since admission. Anticoagulation therapy started yesterday. We have discussed with the patient conservative treatment approach given her recent right hip replacement with a hard LSO brace for stabilization. 10/26/23  Elmira Hopper is resting in bed. Continued LBP and BLE (L>R) radiculopathy. LSO brace intact. Notes severe pain and was unable to stand or get out of bed. Once able to get out of bed. Will need standing xray of LS with brace intact for evaluate alignment. With the patient's recent right hip surgery, we will need to postpone the possible lumbar surgery. This was discussed with the patient in full detail by both Dr. Chriss Paiz and myself. We will continue with current therapy and assess for SNF versus inpatient rehab.    10/27/23  Elmira Hopper resting in bed. Continued overall discomfort. Minimal improved. Has not utilized muscle relaxant. Will schedule Flexeril, Valium prn. Discussed importance of standing so we can get a standing xray to very alignment of LS. IPR not a candidate, recommending SNF placement. 10/28/23  Elmira Hopper is resting in 4A. Transferred from [address] [place] [address] last night 2/2 hypoxia with desaturating to upper 80's on 6 L NC. Increased NC to 15 L improved shortly but then 30 min later 87% SPO2. Transferred to stepdown and RT consulted. CT Chest negative for PE. States that yesterday she was feeling better and was able to get into the chair to eat. Today she is complaining of a return of LBP, left hip and left calf pain.  TTP on 02:36 AM    MCH 31.3 10/29/2023 02:36 AM    MCHC 32.5 10/29/2023 02:36 AM    RDW 13.4 04/08/2018 05:06 AM    NRBC 0 10/28/2023 08:15 AM    SEGSPCT 77.5 10/28/2023 08:15 AM    MONOPCT 7.1 10/28/2023 08:15 AM    MONOSABS 0.7 10/28/2023 08:15 AM    LYMPHSABS 1.2 10/28/2023 08:15 AM    EOSABS 0.2 10/28/2023 08:15 AM    BASOSABS 0.0 10/28/2023 08:15 AM       ASSESSMENT AND PLAN:    Extension type soft tissue injury with an L5 pars defect at the L5-S1 level  Hypoxia, hospitalist following. CT chest negative for PE    1: Anticoagulation therapy started-Xarelto  2:  Activity Level: Weightbearing as tolerated. PT/OT with LSO brace on  3:  Pain Control: Will adjust pain regimen  4:  Discharge Planning: Pending clinical condition Not a candidate for IPR. Recommending SNF  5: Recommend SNF vs [facility]  6: Lumbar spine upright x-rays with LSO brace on  7: Conservative approach for stabilization and lumbar spine with the use of a hard shell LSO brace  8:  Need standing xrays in brace, obtained. Alignment acceptable. 9:  Left calf pain, Order duplex US, r/o DVT.

## 2023-10-29 NOTE — PROGRESS NOTES
[facility]  OCCUPATIONAL THERAPY MISSED TREATMENT NOTE  [address]  4A-03/003-A      Date: 10/29/2023  Patient Name: Yefri Garza        CSN: [id number]   : 1960  (61 y.o.)  Gender: female   Referring Practitioner: AUBREY Aceves CNP  Diagnosis: lumbar compression fracture, closed initial encounter         REASON FOR MISSED TREATMENT:  Will hold OT at this time. Patient transferred to  d/t hypoxia placed on 6-15L. Patient c/o L hip and L calf pain, US of LLE ordered, awaiting results. Will attempt next available time.

## 2023-10-29 NOTE — PROGRESS NOTES
Hospitalist Progress Note      Patient:  Natanael Love    Unit/Bed:4D-06/006-A  YOB: 1960  MRN: [id number]   Acct: [de-identified]   PCP: Ted Branham MD  Date of Admission: 10/24/2023    Assessment/Plan:    Acute hypoxemic respiratory failure  -patient is significantly hypoxic on BiPAP FiO2 100%. Initially she was on high flow nasal cannula, seen this morning and this afternoon because of the blood gas on BiPAP were noted , still hypoxemic. Follows simple commands, spouse was at bedside I spoke to him about sending  the patient to ICU and possible intubation, he consented. The chest CTA was negative for PE remains on Xarelto  2. History of right hip replacement about 10 days ago at         [address] [place] [address], by dr. Son Cain   3. Hx of left Hip pain   4. Significant kyphosis  5. Soft tissue injury with an L5 pars defect at the L5-S1 level -        Orthopedic seen the patient recommended conservative     treatment at this time, on Flexeril and gabapentin      Chief Complaint: Shortness of breath    Initial H and P:-        Natanael Love is a 61 y.o. female with past medical history described below who was admitted by orthopedic surgery for low back pain and was found to have a lumbar fracture. Orthopedic surgery decided to pursue conservative management due to her recent right hip replacement less than 10 days prior. Patient has not gotten out of bed since admission. She is now requiring 4 L nasal cannula. She has no acute respiratory distress and has no complaints including fevers, chills, coughing, nausea, vomiting, diarrhea, abdominal pain, dizziness. Subjective (past 24 hours): She was transferred from 7K/orthopedic unit to a stepdown unit overnight, and she was started on high flow nasal cannula.    Patient was given pain medicines because of pain and Flexeril and then she had significant

## 2023-10-29 NOTE — PROGRESS NOTES
Virtual nurse completed safety round and notified bedside nurse on patient's IV machine beeping with family in the room; will follow up with patient later. Thank you.

## 2023-10-29 NOTE — PROGRESS NOTES
Abg drawn at this time when pt was on bipap. Sats on monitor matched O2 sat on ABG which was 83%. Dr Marcelina Hull and Dr Harjeet Estrada notified.

## 2023-10-29 NOTE — H&P
Intensive Care Unit  Medical Intensive Care Unit  Attending History and Physical        CHIEF COMPLAINT:   Hypoxemia    Indication for ICU Admission:  hypoxic respiratory failure    History Obtained From:  patient, family member - DAUGHTER, non-family caregiver - primary hospitalist ankylosing spondylitis, electronic medical record    HISTORY OF PRESENT ILLNESS:                 The patient is a 61 y.o. female with significant past medical history of ankylosing spondylitis   Underwent R DA PITO with Dr Jon Butcher 10/19/23, noted significant low back and hip pain which increased with activity in post operative therapy course. She was discharged and later returned to the ED 24 October 2023 due to lack of pain control and inability to ambulate, transfer was initiated for further evaluation by spine team for conservative management. MRI was completed of the lumbar spine which did show extension type soft tissue injury with a L5 pars defect the L5-S1 level, was started on Neurontin and Flexeril. she initially required 4 L nasal cannula. Patient receiving Xarelto 10 mg daily. Overnight 10/29/23 patient oxygen saturation worsened and she required HFNC 60 lpm, 100% FiO2, CTA chest showed no PE. Blood gas 7.60/20/60/19  Pulmonary consulted; patient transferred to stepdown and ABGs done showed PO2 of 44 on FiO2 100%, hospitalist called and asked for the patient to be transferred to ICU for treatment and evaluation of hypoxia; Patient transferred down to ICU, when I saw her the patient is not in distress on BiPAP 100% FiO2 some adjustment of the pressure support and the PEEP was done.   The ICU respiratory therapist showed inspiratory pressure of 6 with PEEP of 12 given inspiratory pressure of 18/12 tidal volume was 700-900 patient is not in distress warm and she is not complaining of shortness of breath or chest pain or abdominal pain or nausea or vomiting or headache or loss of consciousness only the pain in her hip the site of 304 10/29/2023 02:36 AM    MPV 11.2 10/29/2023 02:36 AM     CMP:    Lab Results   Component Value Date/Time     10/29/2023 02:36 AM    K 4.2 10/29/2023 02:36 AM    K 4.3 04/08/2018 05:06 AM    CL 98 10/29/2023 02:36 AM    CO2 24 10/29/2023 02:36 AM    BUN 19 10/29/2023 02:36 AM    CREATININE 0.6 10/29/2023 02:36 AM    LABGLOM >60 10/29/2023 02:36 AM    GLUCOSE 115 10/29/2023 02:36 AM    PROT 6.7 10/28/2023 08:15 AM    LABALBU 3.1 10/28/2023 08:15 AM    CALCIUM 9.0 10/29/2023 02:36 AM    BILITOT 0.5 10/28/2023 08:15 AM    ALKPHOS 209 10/28/2023 08:15 AM    AST 21 10/28/2023 08:15 AM    ALT 20 10/28/2023 08:15 AM       ASSESSMENT AND PLAN:  60-year-old female patient underwent back surgery back/hip surgery and back after discharge within the same day with severe lower back pain started on pain management, internal medicine and then pulmonary team also will because the patient oxygenation dropped and patient needs high flow nasal cannula with ABGs was low PO2 increased to 4400% nasal cannula high flow but when the patient shifted to facemask with PEEP of 12 and 6 inspiratory pressure her pulse ox was 100% with PO2 160 with ABGs done. Patient not in distress. On both samples of from nasal cannula to high flow and facemask at PCO2 30 indicating adequate ventilatory work with a history of kyphosis. Wide  alveolar-arterial oxygen gradient  Gradient improved with facemask BiPAP ventilation, CTA did not show any PE ;patient had no subconjunctival hemorrhage and she is not confused &10 days after surgery so fat embolism syndrome is a remote possibility   possibility of atelectasis postsurgery and painkiller use that led to shunt effect from atelectasis, other possible is cardiac shunt so we will order echocardiogram with bubble test a.m. Currently will decrease FiO2 to keep pulse ox more than 92% and keep her on BiPAP tonight NPO. Pain care management.   Primary orthopedic team.  GI DVT prophylaxis per the primary surgical orthopedic team.    Plan discussed in detail with the patient  and daughter and ICU nursing team.  Critical care time 40 min apart from procedures

## 2023-10-29 NOTE — PROGRESS NOTES
[facility]  PHYSICAL THERAPY MISSED TREATMENT NOTE  [address]    Date: 10/29/2023  Patient Name: Duncan Rivera        MRN: [id number]   : 1960  (61 y.o.)  Gender: female   Referring Practitioner: AUBREY Savage CNP  Diagnosis: Lumbar compression fracture, closed, initial encounter         REASON FOR MISSED TREATMENT:  Patient transferred from [address] [place] [address] to  due to low 02 sats and now requiring high flow 02. Hold PT today, 10/29 as patient continues to have low oxygen saturation levels on the high flow 02. CTA of chest negative for PE, but Doppler ordered to check for DVT in LLE.      CASTRO WorthingtonT

## 2023-10-30 ENCOUNTER — APPOINTMENT (OUTPATIENT)
Dept: CT IMAGING | Age: 63
DRG: 551 | End: 2023-10-30
Attending: ORTHOPAEDIC SURGERY
Payer: COMMERCIAL

## 2023-10-30 ENCOUNTER — APPOINTMENT (OUTPATIENT)
Dept: GENERAL RADIOLOGY | Age: 63
DRG: 551 | End: 2023-10-30
Attending: ORTHOPAEDIC SURGERY
Payer: COMMERCIAL

## 2023-10-30 ENCOUNTER — APPOINTMENT (OUTPATIENT)
Dept: INTERVENTIONAL RADIOLOGY/VASCULAR | Age: 63
DRG: 551 | End: 2023-10-30
Attending: ORTHOPAEDIC SURGERY
Payer: COMMERCIAL

## 2023-10-30 LAB
ALBUMIN SERPL BCG-MCNC: 3.6 G/DL (ref 3.5–5.1)
ALP SERPL-CCNC: 258 U/L (ref 38–126)
ALT SERPL W/O P-5'-P-CCNC: 13 U/L (ref 11–66)
ANION GAP SERPL CALC-SCNC: 19 MEQ/L (ref 8–16)
AST SERPL-CCNC: 18 U/L (ref 5–40)
BASOPHILS ABSOLUTE: 0 THOU/MM3 (ref 0–0.1)
BASOPHILS NFR BLD AUTO: 0.4 %
BILIRUB SERPL-MCNC: 1.1 MG/DL (ref 0.3–1.2)
BUN SERPL-MCNC: 19 MG/DL (ref 7–22)
CALCIUM SERPL-MCNC: 9 MG/DL (ref 8.5–10.5)
CHLORIDE SERPL-SCNC: 98 MEQ/L (ref 98–111)
CO2 SERPL-SCNC: 19 MEQ/L (ref 23–33)
CREAT SERPL-MCNC: 0.7 MG/DL (ref 0.4–1.2)
DEPRECATED RDW RBC AUTO: 54.6 FL (ref 35–45)
EOSINOPHIL NFR BLD AUTO: 0.2 %
EOSINOPHILS ABSOLUTE: 0 THOU/MM3 (ref 0–0.4)
ERYTHROCYTE [DISTWIDTH] IN BLOOD BY AUTOMATED COUNT: 14.5 % (ref 11.5–14.5)
GFR SERPL CREATININE-BSD FRML MDRD: > 60 ML/MIN/1.73M2
GLUCOSE SERPL-MCNC: 108 MG/DL (ref 70–108)
HCT VFR BLD AUTO: 37.9 % (ref 37–47)
HGB BLD-MCNC: 11.5 GM/DL (ref 12–16)
IMM GRANULOCYTES # BLD AUTO: 0.11 THOU/MM3 (ref 0–0.07)
IMM GRANULOCYTES NFR BLD AUTO: 1.1 %
LYMPHOCYTES ABSOLUTE: 0.6 THOU/MM3 (ref 1–4.8)
LYMPHOCYTES NFR BLD AUTO: 5.9 %
MCH RBC QN AUTO: 31.6 PG (ref 26–33)
MCHC RBC AUTO-ENTMCNC: 30.3 GM/DL (ref 32.2–35.5)
MCV RBC AUTO: 104.1 FL (ref 81–99)
MONOCYTES ABSOLUTE: 0.5 THOU/MM3 (ref 0.4–1.3)
MONOCYTES NFR BLD AUTO: 4.9 %
NEUTROPHILS NFR BLD AUTO: 87.5 %
NRBC BLD AUTO-RTO: 0 /100 WBC
PLATELET # BLD AUTO: 272 THOU/MM3 (ref 130–400)
PMV BLD AUTO: 11.1 FL (ref 9.4–12.4)
POTASSIUM SERPL-SCNC: 3.7 MEQ/L (ref 3.5–5.2)
PROT SERPL-MCNC: 6 G/DL (ref 6.1–8)
RBC # BLD AUTO: 3.64 MILL/MM3 (ref 4.2–5.4)
SEGMENTED NEUTROPHILS ABSOLUTE COUNT: 9.2 THOU/MM3 (ref 1.8–7.7)
SODIUM SERPL-SCNC: 136 MEQ/L (ref 135–145)
TROPONIN, HIGH SENSITIVITY: 227 NG/L (ref 0–12)
WBC # BLD AUTO: 10.5 THOU/MM3 (ref 4.8–10.8)

## 2023-10-30 PROCEDURE — 97110 THERAPEUTIC EXERCISES: CPT

## 2023-10-30 PROCEDURE — 93306 TTE W/DOPPLER COMPLETE: CPT

## 2023-10-30 PROCEDURE — 36415 COLL VENOUS BLD VENIPUNCTURE: CPT

## 2023-10-30 PROCEDURE — 6370000000 HC RX 637 (ALT 250 FOR IP): Performed by: STUDENT IN AN ORGANIZED HEALTH CARE EDUCATION/TRAINING PROGRAM

## 2023-10-30 PROCEDURE — 6370000000 HC RX 637 (ALT 250 FOR IP): Performed by: NURSE PRACTITIONER

## 2023-10-30 PROCEDURE — 85025 COMPLETE CBC W/AUTO DIFF WBC: CPT

## 2023-10-30 PROCEDURE — 94660 CPAP INITIATION&MGMT: CPT

## 2023-10-30 PROCEDURE — 80053 COMPREHEN METABOLIC PANEL: CPT

## 2023-10-30 PROCEDURE — 74018 RADEX ABDOMEN 1 VIEW: CPT

## 2023-10-30 PROCEDURE — 74177 CT ABD & PELVIS W/CONTRAST: CPT

## 2023-10-30 PROCEDURE — 6360000002 HC RX W HCPCS: Performed by: NURSE PRACTITIONER

## 2023-10-30 PROCEDURE — 84484 ASSAY OF TROPONIN QUANT: CPT

## 2023-10-30 PROCEDURE — 97530 THERAPEUTIC ACTIVITIES: CPT

## 2023-10-30 PROCEDURE — 6370000000 HC RX 637 (ALT 250 FOR IP): Performed by: PHYSICIAN ASSISTANT

## 2023-10-30 PROCEDURE — 6370000000 HC RX 637 (ALT 250 FOR IP)

## 2023-10-30 PROCEDURE — 2000000000 HC ICU R&B

## 2023-10-30 PROCEDURE — 93971 EXTREMITY STUDY: CPT

## 2023-10-30 PROCEDURE — 99233 SBSQ HOSP IP/OBS HIGH 50: CPT | Performed by: INTERNAL MEDICINE

## 2023-10-30 PROCEDURE — 93010 ELECTROCARDIOGRAM REPORT: CPT | Performed by: NUCLEAR MEDICINE

## 2023-10-30 PROCEDURE — 94640 AIRWAY INHALATION TREATMENT: CPT

## 2023-10-30 PROCEDURE — 93005 ELECTROCARDIOGRAM TRACING: CPT | Performed by: NURSE PRACTITIONER

## 2023-10-30 PROCEDURE — 2580000003 HC RX 258: Performed by: NURSE PRACTITIONER

## 2023-10-30 RX ORDER — IPRATROPIUM BROMIDE AND ALBUTEROL SULFATE 2.5; .5 MG/3ML; MG/3ML
1 SOLUTION RESPIRATORY (INHALATION) EVERY 4 HOURS PRN
Status: DISCONTINUED | OUTPATIENT
Start: 2023-10-30 | End: 2023-11-03 | Stop reason: HOSPADM

## 2023-10-30 RX ADMIN — IPRATROPIUM BROMIDE AND ALBUTEROL SULFATE 1 DOSE: .5; 3 SOLUTION RESPIRATORY (INHALATION) at 09:29

## 2023-10-30 RX ADMIN — GABAPENTIN 600 MG: 300 CAPSULE ORAL at 09:21

## 2023-10-30 RX ADMIN — SENNOSIDES 17.2 MG: 8.6 TABLET, FILM COATED ORAL at 21:10

## 2023-10-30 RX ADMIN — CYCLOBENZAPRINE 10 MG: 10 TABLET, FILM COATED ORAL at 09:20

## 2023-10-30 RX ADMIN — CYCLOBENZAPRINE 10 MG: 10 TABLET, FILM COATED ORAL at 21:10

## 2023-10-30 RX ADMIN — PIPERACILLIN AND TAZOBACTAM 4500 MG: 4; .5 INJECTION, POWDER, LYOPHILIZED, FOR SOLUTION INTRAVENOUS at 15:14

## 2023-10-30 RX ADMIN — POLYETHYLENE GLYCOL (3350) 17 G: 17 POWDER, FOR SOLUTION ORAL at 09:20

## 2023-10-30 RX ADMIN — DOCUSATE SODIUM 100 MG: 100 CAPSULE, LIQUID FILLED ORAL at 09:20

## 2023-10-30 RX ADMIN — RIVAROXABAN 10 MG: 10 TABLET, FILM COATED ORAL at 18:56

## 2023-10-30 RX ADMIN — DOCUSATE SODIUM 100 MG: 100 CAPSULE, LIQUID FILLED ORAL at 21:10

## 2023-10-30 RX ADMIN — PIPERACILLIN AND TAZOBACTAM 3375 MG: 3; .375 INJECTION, POWDER, LYOPHILIZED, FOR SOLUTION INTRAVENOUS at 21:12

## 2023-10-30 RX ADMIN — GABAPENTIN 600 MG: 300 CAPSULE ORAL at 21:10

## 2023-10-30 RX ADMIN — PANTOPRAZOLE SODIUM 40 MG: 40 TABLET, DELAYED RELEASE ORAL at 05:04

## 2023-10-30 RX ADMIN — IPRATROPIUM BROMIDE AND ALBUTEROL SULFATE 1 DOSE: .5; 3 SOLUTION RESPIRATORY (INHALATION) at 20:32

## 2023-10-30 ASSESSMENT — ENCOUNTER SYMPTOMS
COUGH: 0
COLOR CHANGE: 0
ABDOMINAL PAIN: 0
SORE THROAT: 0
BACK PAIN: 0
NAUSEA: 0
ABDOMINAL DISTENTION: 0
CHEST TIGHTNESS: 0
VOMITING: 0
WHEEZING: 0
CONSTIPATION: 1
DIARRHEA: 0
EYE REDNESS: 0
SHORTNESS OF BREATH: 1

## 2023-10-30 ASSESSMENT — PAIN SCALES - GENERAL: PAINLEVEL_OUTOF10: 0

## 2023-10-30 NOTE — PROGRESS NOTES
Department of Orthopedic Surgery  Spine Service    Progress Note        Subjective:    Juanita Forbes was seen this am laying flat in bed tearful. She reports significant pain in the low lack that radiates down the bilateral hips and posterior legs which is exacerbated by movement. She is post right hip replacement on 10/19/2023 by Dr. Kina Neal. MRI was completed of the lumbar spine which did show extension type soft tissue injury with a L5 pars defect the L5-S1 level. She has not been out of the bed since admission. Anticoagulation therapy started yesterday. We have discussed with the patient conservative treatment approach given her recent right hip replacement with a hard LSO brace for stabilization. 10/26/23  Juanita Forbes is resting in bed. Continued LBP and BLE (L>R) radiculopathy. LSO brace intact. Notes severe pain and was unable to stand or get out of bed. Once able to get out of bed. Will need standing xray of LS with brace intact for evaluate alignment. With the patient's recent right hip surgery, we will need to postpone the possible lumbar surgery. This was discussed with the patient in full detail by both Dr. Wilver Alex and myself. We will continue with current therapy and assess for SNF versus inpatient rehab.    10/27/23  Juanita Forbes resting in bed. Continued overall discomfort. Minimal improved. Has not utilized muscle relaxant. Will schedule Flexeril, Valium prn. Discussed importance of standing so we can get a standing xray to very alignment of LS. IPR not a candidate, recommending SNF placement. 10/29/23  Juanita Forbes is resting in 4A. Transferred from [address] [place] [address] last night 2/2 hypoxia with desaturating to upper 80's on 6 L NC. Increased NC to 15 L improved shortly but then 30 min later 87% SPO2. Transferred to stepdown and RT consulted. CT Chest negative for PE. States that yesterday she was feeling better and was able to get into the chair to eat. Today she is complaining of a return of LBP, left hip and left calf pain.  TTP on Calves are nontender without evidence of DVT. On strength exam she does show weakness of the left side with a great toe extension graded 4/5. Otherwise she maintains 5/5 motor strength. Lower Extremity Sensory:  Intact L1-S1    Flatus:  negative    ABNORMAL EXAM FINDINGS:  TTP left calf pain    LABS:    HgB:    Lab Results   Component Value Date/Time    HGB 11.0 10/29/2023 04:17 PM     CBC with Differential:    Lab Results   Component Value Date/Time    WBC 11.7 10/29/2023 04:17 PM    RBC 3.54 10/29/2023 04:17 PM    HGB 11.0 10/29/2023 04:17 PM    HCT 34.0 10/29/2023 04:17 PM     10/29/2023 04:17 PM    MCV 96.0 10/29/2023 04:17 PM    MCH 31.1 10/29/2023 04:17 PM    MCHC 32.4 10/29/2023 04:17 PM    RDW 13.4 04/08/2018 05:06 AM    NRBC 0 10/29/2023 04:17 PM    SEGSPCT 81.4 10/29/2023 04:17 PM    MONOPCT 6.9 10/29/2023 04:17 PM    MONOSABS 0.8 10/29/2023 04:17 PM    LYMPHSABS 1.2 10/29/2023 04:17 PM    EOSABS 0.1 10/29/2023 04:17 PM    BASOSABS 0.0 10/29/2023 04:17 PM       ASSESSMENT AND PLAN:    Extension type soft tissue injury with an L5 pars defect at the L5-S1 level  Hypoxia, hospitalist following. CT chest negative for PE    1: Anticoagulation therapy started-Xarelto  2:  Activity Level: Weightbearing as tolerated. PT/OT with LSO brace on  3:  Pain Control: Will adjust pain regimen  4:  Discharge Planning: Pending clinical condition Not a candidate for IPR. Recommending SNF  5: Recommend SNF vs [facility]  6: Lumbar spine upright x-rays with LSO brace on  7: Conservative approach for stabilization and lumbar spine with the use of a hard shell LSO brace  8:  Need standing xrays in brace, obtained. Alignment acceptable. 9:  Further workup per pulmonary, critical care, and hospitalist. Appreciate assistance.

## 2023-10-30 NOTE — PROGRESS NOTES
10/30/23 0800   Encounter Summary   Encounter Overview/Reason  Attempted Encounter   Service Provided For: Patient   Referral/Consult From: [address] Family members   Last Encounter  10/30/23  (N/R)   Complexity of Encounter Low   Begin Time 0755   End Time  0800   Total Time Calculated 5 min   Spiritual/Emotional needs   Type Spiritual Support   Assessment/Intervention/Outcome   Assessment Unable to assess   Intervention Sustaining Presence/Ministry of presence;Prayer (assurance of)/[place]     In my encounter with the 61  yr old patient, I attempted to see the patient in ICU, but the patient was unresponsive at this time. No family was present in the room. I offered a prayer at the pt's side. A  will attempt to see the patient at a later time as a follow up. The pt was admitted due to lumbar compression fracture.

## 2023-10-30 NOTE — PROGRESS NOTES
[facility]  INPATIENT PHYSICAL THERAPY  DAILY NOTE  [facility] ICU 4D - 4D-06/006-A    Time In: 289  Time Out: 1407  Timed Code Treatment Minutes: 29 Minutes  Minutes: 29          Date: 10/30/2023  Patient Name: Juliette Tijerina,  Gender:  female        MRN: [id number]  : 1960  (61 y.o.)     Referring Practitioner: AUBREY Barrientos CNP  Diagnosis: Lumbar compression fracture, closed, initial encounter  Additional Pertinent Hx: Per EMr \"61 y.o. female who presents as a transfer from [facility] for further evaluation of severe hip and left leg radicular pain, lumbar pain. Underwent R DA PITO with Dr Lisa Macedo 10/19/23, noted significant low back and hip pain which increased with activity in post operative therapy course. She was discharged and later returned to the ED due to lack of pain control and inability to ambulate. Imaging revealed changes at C3, L5-S1, transfer was initiated for further evaluation by spine team. Her right hip pain is well controlled at this time, paresthesias predominately at the LLE and L low back. No bowel or bladder disturbances. Does admit to some weakness overall post operatively. \" Imaging showed Acute nondisplaced fracture anterior inferior T11 vertebral body. MRI was completed of the lumbar spine which did show extension type soft tissue injury with a L5 pars defect the L5-S1 level. Ortho treating nonsurgically at this time. LSO to be worn out of bed. Overnight 10/29/23 patient oxygen saturation worsened and she required HFNC 60 lpm, 100% FiO2, CTA chest showed no PE. Hospitalist called and asked for the patient to be transferred to ICU for treatment and evaluation of hypoxia; Patient transferred down to ICU, when I saw her the patient is not in distress on BiPAP. B Doppler 10/30 negative for DVT.      Prior Level of Function:  Lives With: Spouse  Type of Home: House  Home Layout: Two level, Able to Live on Main level with bedroom/bathroom, Performs ADL's on one level  Home Equipment: Yvette Rodrigues, standard, Wheelchair-manual (has wheels for walker)   Bathroom Shower/Tub: Tub/Shower unit  Bathroom Toilet: Standard  Bathroom Equipment:  (has been sponge bathing)    ADL Assistance: [address] Independent  Homemaking Responsibilities: Yes  Ambulation Assistance: Independent  Transfer Assistance: Independent  Active : Yes  Additional Comments: Typically indep withunctional mobility using cane or cart while at work. Does not use AD when in the house. Typically indep with ADL and IADLs. Restrictions/Precautions:  Restrictions/Precautions: Fall Risk, General Precautions  Required Braces or Orthoses  Spinal: Lumbar Corset  Spinal Other: hard LSO to be worn with all activity and when out of bed  Position Activity Restriction  Spinal Precautions: No Bending, No Lifting, No Twisting  Hip Precautions: Anterior hip precautions  Other position/activity restrictions: non-surgical as of date of eval-monitor upcoming imaging for stability of lumbar region. R PITO 10/19 by  Dr Sandy Brooks. Left sided sciatica like pain     SUBJECTIVE: RN approved session, pt is agreeable to PT, frustrated at events over the weekend. RN in for enema, pt left on bed pan to try to void. Pt with elevated RR with activity, cues to slow breathing, pt anxious.     PAIN: 6/10:  R hip, 3/10 L LE    Vitals: Blood Pressure: 104/40 at rest  Oxygen: 100% on high flow O2, 50L, 50% FiO2; RR mid 20's to upper 30's with activity, cues to slow her breathing  Heart Rate: 89 at rest    OBJECTIVE:  Bed Mobility:  Rolling to Left: Moderate Assistance, X 1, with head of bed flat, with rail, with verbal cues , with increased time for completion   Rolling to Right: Moderate Assistance, X 1, with head of bed flat, with rail, with verbal cues , with increased time for completion     Transfers:  EOB held to allow pt to try to have BM on bed pain, also pt with elevated RR with supine

## 2023-10-30 NOTE — PROGRESS NOTES
Consult for emotional distress. Patient AS and has had complications from the disease requiring back surgery. She was then experiencing difficulty with her oxygen levels, which led to her distress and the consult. Patient is currently on BiPAP. She states she is feeing better now and less anxious. We shared a prayer for healing and peace.

## 2023-10-30 NOTE — PLAN OF CARE
Problem: Discharge Planning  Goal: Discharge to home or other facility with appropriate resources  Outcome: Progressing     Problem: Skin/Tissue Integrity  Goal: Absence of new skin breakdown  Description: 1. Monitor for areas of redness and/or skin breakdown  2. Assess vascular access sites hourly  3. Every 4-6 hours minimum:  Change oxygen saturation probe site  4. Every 4-6 hours:  If on nasal continuous positive airway pressure, respiratory therapy assess nares and determine need for appliance change or resting period.   Outcome: Progressing     Problem: Safety - Adult  Goal: Free from fall injury  Outcome: Progressing     Problem: Pain  Goal: Verbalizes/displays adequate comfort level or baseline comfort level  Outcome: Progressing     Problem: Respiratory - Adult  Goal: Achieves optimal ventilation and oxygenation  Outcome: Progressing  Goal: Clear lung sounds  Outcome: Progressing     Problem: Skin/Tissue Integrity - Adult  Goal: Skin integrity remains intact  Outcome: Progressing     Problem: Musculoskeletal - Adult  Goal: Return mobility to safest level of function  Outcome: Progressing     Problem: Genitourinary - Adult  Goal: Urinary catheter remains patent  Outcome: Progressing  Flowsheets (Taken 10/29/2023 2000)  Urinary catheter remains patent: Assess patency of urinary catheter     Problem: ABCDS Injury Assessment  Goal: Absence of physical injury  Outcome: Progressing

## 2023-10-30 NOTE — CARE COORDINATION
10/30/23, 2:27 PM EDT    DISCHARGE ON GOING [address] day: 6  Location: -06/006-A Reason for admit: Lumbar compression fracture, closed, initial encounter ([address] ) [Q83.753K]   Procedure:   10/27 L/S xray with brace on: Bones are diffusely demineralized, consistent with osteoporosis. Mild thoracolumbar levoscoliosis. Cannot exclude muscle spasm right side. No fracture seen. Disc spaces well-maintained. Question degenerative changes both sacroiliac joints which are not well demonstrated on this study  10/29 CTA Chest: No acute pulmonary embolus; Ascending aortic aneurysm 4.4 cm diameter. Follow-up as needed; Acute nondisplaced fracture anterior inferior T11 vertebral body  10/30 LLE Venous doppler: Neg for DVT    Barriers to Discharge: Transferred to 4A on 10/29 for HFNC, then to ICU on 10/29 on max bipap for possible intubation. Bipap settings were adjusted. Echo ordered. Soap suds enema today. Now on HFNC, 50L, 50% FIO2, sats 99%. Bipap HS & PRN. Afebrile. NSR. Ox4. Follows commands. PT/OT. TLSO brace when up. WBAT. Urine +nonfermenting gram neg bacilli and enteric gram negative bacilli. Telemetry, sal. Flexeril, prn valium, colace, neurontin, nebs, prn IV morphine, prn percocet, protonix, IV zosyn, glycolax, xarelto, senna. Alb 3.3, alk phos 217, wbc 11.7, hgb 11. PCP: Eduin Ordonez MD  Readmission Risk Score: 9.7%  Patient Goals/Plan/Treatment Preferences: From home. Not a candidate for IPR and refused ECF. Pt considering [place]. SW on case. Will require precert.

## 2023-10-30 NOTE — CARE COORDINATION
10/30/23, 8:03 AM EDT    DISCHARGE BARRIERS        Patient transferred to  6. Report given to unit Lenoard Patience, regarding discharge plan for this patient.   Patient unsure of discharge plan, may consider [facility]

## 2023-10-30 NOTE — PROGRESS NOTES
[facility] THERAPY MISSED TREATMENT NOTE  [facility] ICU 4D  4D-006-A      Date: 10/30/2023  Patient Name: Estrella Corona        CSN: [id number]   : 1960  (61 y.o.)  Gender: female   Referring Practitioner: AUBREY Chavarria CNP  Diagnosis: lumbar compression fracture, closed initial encounter         REASON FOR MISSED TREATMENT: Patient Unavailable pt had enema recently and getting cleaned up on OT arrival. When pt turned, had another BM episode requiring further cleanup from bed level. Will check back tomorrow.

## 2023-10-30 NOTE — PROGRESS NOTES
Patient:  Ligia Shi    Unit/Bed:4D-06/006-A  MRN: [id number]   PCP: Audie De Jesus MD  Date of Admission: 10/24/2023    Assessment and Plan(All pulmonary edema, renal failure, PE, and respiratory failure diagnoses are acute in nature unless otherwise specified):        Acute hypoxic respiratory failure: Required BiPAP support, transitioned back to HF NC today. Recommend BiPAP at night and as needed. Keep in ICU; remains moderately tachypneic. Significantly elevated Aa gradient indicating VQ mismatch. Primary differential is atelectasis in setting of limited mobility post-surgery from persistent severe pain. Follow CXR. PT/OT today. Pain is controlled today. Consideration for effects of sepsis from UTI. No signs of pneumonia. Procal was essentially negative. Only mild leukocytosis. ECG negative for acute ischemia despite troponin elevation. Repeat troponin and TTE pending. No pulmonary edema on CXR seen. Negative for PE and DVT on imaging. UTI: 2/2 enteric GNB and non-fermenting GNB. High colony counts and high WBC on UA. Start Zosyn for pseudomonas coverage. L5 Pars Defect / Stress fracture: orthopedics primary, managing conservatively. LSO brace. PT/OT. Pain control per primary. Chronic kyphosis: ?ankylosing spondylitis history. Prior HLA- antigen B27 positive in 2014. Patient has no knowledge of this. Will re-send HLA antigen B27. S/p right-sided PITO: S/p surgery on 10/19. Ortho following. PT/OT. On Xarelto post-op for DVT prophylaxis, per primary team.  HTN: controlled. Not on meds. CC:  acute hypoxic respiratory failure  HPI: Pattie Jauregui is a 60 y/o female never-smoker with PMH of HTN, arthritis, lumbar radiculopathy and left hip radiculopathy. Patient had a right-sided PITO on 10/19/23 with orthopedic surgery. Post-op course was complicated by severe hip pain, left leg radicular pain, and lumbar back pain.  CT+MRI lumbar spine demonstrated L5 pars defect the L5-S1 level, widening of the disk space and left neural foraminal narrowing. A brace was applied, and patient as discharged. She came back to the ED 10/24/23, due to lack of pain control and inability to ambulate. Patient was started on flexeril and neurontin. She was requiring 4L NC, but on 10/29 had escalation to HF NC. CTA chest was done, but there was no PE. Pulmonary was consulted. ABG showed respiratory alkalosis. BiPAP was applied. Patient was transferred to ICU. She patient was maintained on BiPAP  not in distress. Patient did not require intubation. For further details, please see assessment and plan. ROS: Review of Systems   Constitutional:  Negative for chills and fever. HENT:  Negative for congestion and sore throat. Eyes:  Negative for redness and visual disturbance. Respiratory:  Positive for shortness of breath (mild). Negative for cough, chest tightness and wheezing. Cardiovascular:  Negative for chest pain and leg swelling. Gastrointestinal:  Positive for constipation. Negative for abdominal distention, abdominal pain, diarrhea, nausea and vomiting. Genitourinary:  Negative for difficulty urinating, dysuria and frequency. Musculoskeletal:  Negative for back pain and neck pain. Skin:  Negative for color change and pallor. Neurological:  Negative for dizziness, light-headedness, numbness and headaches. Psychiatric/Behavioral:  Negative for agitation and confusion. PMH:  Per HPI  SHX:   Never-smoker. Denies alcohol or substance use. FHX: Father- unspecified cancer. Allergies: NKDA.    Medications:       ipratropium 0.5 mg-albuterol 2.5 mg  1 Dose Inhalation TID RT    pantoprazole  40 mg Oral QAM AC    cyclobenzaprine  10 mg Oral TID    gabapentin  600 mg Oral TID    docusate sodium  100 mg Oral BID    senna  2 tablet Oral Nightly    polyethylene glycol  17 g Oral Daily    rivaroxaban  10 mg Oral Daily       Vital Signs:   T: 97.9: P: 78 RR: 30 B/P: 104/40: FiO2: 50% (HF NC 50 lpm): O2 closure. Italicized font, if present,  represents changes to the note made by me. CC time 35 minutes. Time was discontiguous. Time does not include procedure. Time does include my direct assessment of the patient and coordination of care. Time represents more than 50% of the time involved with patient care by the [address] [place] [address] team.  Electronically signed by Mathieu Moeller.  Eloy Phillip MD.

## 2023-10-30 NOTE — PROGRESS NOTES
RN administered soap suds enema. Prior to administration, writer noted watery/liquid stool leaking out of patient's rectum. Writer was unable to feel any impaction inside of rectum. Provider notified.

## 2023-10-31 ENCOUNTER — APPOINTMENT (OUTPATIENT)
Dept: ULTRASOUND IMAGING | Age: 63
DRG: 551 | End: 2023-10-31
Attending: ORTHOPAEDIC SURGERY
Payer: COMMERCIAL

## 2023-10-31 LAB
ANION GAP SERPL CALC-SCNC: 9 MEQ/L (ref 8–16)
APTT PPP: 34 SECONDS (ref 22–38)
BACTERIA SPEC AEROBE CULT: NORMAL
BACTERIA UR CULT: ABNORMAL
BACTERIA UR CULT: ABNORMAL
BASOPHILS ABSOLUTE: 0 THOU/MM3 (ref 0–0.1)
BASOPHILS NFR BLD AUTO: 0.1 %
BUN SERPL-MCNC: 16 MG/DL (ref 7–22)
CALCIUM SERPL-MCNC: 8.7 MG/DL (ref 8.5–10.5)
CHLORIDE SERPL-SCNC: 100 MEQ/L (ref 98–111)
CO2 SERPL-SCNC: 25 MEQ/L (ref 23–33)
CREAT SERPL-MCNC: 0.7 MG/DL (ref 0.4–1.2)
DEPRECATED RDW RBC AUTO: 51.4 FL (ref 35–45)
EOSINOPHIL NFR BLD AUTO: 0.2 %
EOSINOPHILS ABSOLUTE: 0 THOU/MM3 (ref 0–0.4)
ERYTHROCYTE [DISTWIDTH] IN BLOOD BY AUTOMATED COUNT: 14.6 % (ref 11.5–14.5)
GFR SERPL CREATININE-BSD FRML MDRD: > 60 ML/MIN/1.73M2
GGT SERPL-CCNC: 165 U/L (ref 8–69)
GLUCOSE SERPL-MCNC: 107 MG/DL (ref 70–108)
HCT VFR BLD AUTO: 32 % (ref 37–47)
HEPARIN UNFRACTIONATED: 0.3 U/ML (ref 0.3–0.7)
HGB BLD-MCNC: 10.5 GM/DL (ref 12–16)
IMM GRANULOCYTES # BLD AUTO: 0.08 THOU/MM3 (ref 0–0.07)
IMM GRANULOCYTES NFR BLD AUTO: 0.6 %
INR PPP: 1.28 (ref 0.85–1.13)
LYMPHOCYTES ABSOLUTE: 0.6 THOU/MM3 (ref 1–4.8)
LYMPHOCYTES NFR BLD AUTO: 4.4 %
MAGNESIUM SERPL-MCNC: 2.2 MG/DL (ref 1.6–2.4)
MCH RBC QN AUTO: 32 PG (ref 26–33)
MCHC RBC AUTO-ENTMCNC: 32.8 GM/DL (ref 32.2–35.5)
MCV RBC AUTO: 97.6 FL (ref 81–99)
MONOCYTES ABSOLUTE: 0.7 THOU/MM3 (ref 0.4–1.3)
MONOCYTES NFR BLD AUTO: 5 %
NEUTROPHILS NFR BLD AUTO: 89.7 %
NRBC BLD AUTO-RTO: 0 /100 WBC
ORGANISM: ABNORMAL
ORGANISM: ABNORMAL
PLATELET # BLD AUTO: 278 THOU/MM3 (ref 130–400)
PMV BLD AUTO: 11.1 FL (ref 9.4–12.4)
POTASSIUM SERPL-SCNC: 3.6 MEQ/L (ref 3.5–5.2)
RBC # BLD AUTO: 3.28 MILL/MM3 (ref 4.2–5.4)
SEGMENTED NEUTROPHILS ABSOLUTE COUNT: 12.1 THOU/MM3 (ref 1.8–7.7)
SODIUM SERPL-SCNC: 134 MEQ/L (ref 135–145)
WBC # BLD AUTO: 13.5 THOU/MM3 (ref 4.8–10.8)

## 2023-10-31 PROCEDURE — 6360000002 HC RX W HCPCS: Performed by: NURSE PRACTITIONER

## 2023-10-31 PROCEDURE — 83735 ASSAY OF MAGNESIUM: CPT

## 2023-10-31 PROCEDURE — 93320 DOPPLER ECHO COMPLETE: CPT

## 2023-10-31 PROCEDURE — 36415 COLL VENOUS BLD VENIPUNCTURE: CPT

## 2023-10-31 PROCEDURE — 6370000000 HC RX 637 (ALT 250 FOR IP)

## 2023-10-31 PROCEDURE — 6360000002 HC RX W HCPCS

## 2023-10-31 PROCEDURE — 6370000000 HC RX 637 (ALT 250 FOR IP): Performed by: PHYSICIAN ASSISTANT

## 2023-10-31 PROCEDURE — 97530 THERAPEUTIC ACTIVITIES: CPT

## 2023-10-31 PROCEDURE — 94761 N-INVAS EAR/PLS OXIMETRY MLT: CPT

## 2023-10-31 PROCEDURE — 2700000000 HC OXYGEN THERAPY PER DAY

## 2023-10-31 PROCEDURE — 85520 HEPARIN ASSAY: CPT

## 2023-10-31 PROCEDURE — 6370000000 HC RX 637 (ALT 250 FOR IP): Performed by: STUDENT IN AN ORGANIZED HEALTH CARE EDUCATION/TRAINING PROGRAM

## 2023-10-31 PROCEDURE — 93312 ECHO TRANSESOPHAGEAL: CPT

## 2023-10-31 PROCEDURE — 93325 DOPPLER ECHO COLOR FLOW MAPG: CPT

## 2023-10-31 PROCEDURE — 85025 COMPLETE CBC W/AUTO DIFF WBC: CPT

## 2023-10-31 PROCEDURE — 6370000000 HC RX 637 (ALT 250 FOR IP): Performed by: NURSE PRACTITIONER

## 2023-10-31 PROCEDURE — 86812 HLA TYPING A B OR C: CPT

## 2023-10-31 PROCEDURE — 2580000003 HC RX 258: Performed by: NURSE PRACTITIONER

## 2023-10-31 PROCEDURE — 85730 THROMBOPLASTIN TIME PARTIAL: CPT

## 2023-10-31 PROCEDURE — 99233 SBSQ HOSP IP/OBS HIGH 50: CPT | Performed by: INTERNAL MEDICINE

## 2023-10-31 PROCEDURE — 82977 ASSAY OF GGT: CPT

## 2023-10-31 PROCEDURE — 76705 ECHO EXAM OF ABDOMEN: CPT

## 2023-10-31 PROCEDURE — 80048 BASIC METABOLIC PNL TOTAL CA: CPT

## 2023-10-31 PROCEDURE — 85610 PROTHROMBIN TIME: CPT

## 2023-10-31 PROCEDURE — 2000000000 HC ICU R&B

## 2023-10-31 PROCEDURE — 99223 1ST HOSP IP/OBS HIGH 75: CPT | Performed by: INTERNAL MEDICINE

## 2023-10-31 RX ORDER — MIDAZOLAM HYDROCHLORIDE 1 MG/ML
INJECTION INTRAMUSCULAR; INTRAVENOUS
Status: COMPLETED
Start: 2023-10-31 | End: 2023-10-31

## 2023-10-31 RX ORDER — MIDAZOLAM HYDROCHLORIDE 1 MG/ML
4 INJECTION INTRAMUSCULAR; INTRAVENOUS ONCE
Status: COMPLETED | OUTPATIENT
Start: 2023-10-31 | End: 2023-10-31

## 2023-10-31 RX ORDER — NALOXONE HYDROCHLORIDE 0.4 MG/ML
0.4 INJECTION, SOLUTION INTRAMUSCULAR; INTRAVENOUS; SUBCUTANEOUS ONCE
Status: DISCONTINUED | OUTPATIENT
Start: 2023-10-31 | End: 2023-11-01

## 2023-10-31 RX ORDER — HEPARIN SODIUM 1000 [USP'U]/ML
4000 INJECTION, SOLUTION INTRAVENOUS; SUBCUTANEOUS PRN
Status: DISCONTINUED | OUTPATIENT
Start: 2023-10-31 | End: 2023-11-02

## 2023-10-31 RX ORDER — FENTANYL CITRATE 50 UG/ML
200 INJECTION, SOLUTION INTRAMUSCULAR; INTRAVENOUS ONCE
Status: COMPLETED | OUTPATIENT
Start: 2023-10-31 | End: 2023-10-31

## 2023-10-31 RX ORDER — ACETAMINOPHEN 325 MG/1
650 TABLET ORAL EVERY 6 HOURS PRN
Status: DISCONTINUED | OUTPATIENT
Start: 2023-10-31 | End: 2023-11-03 | Stop reason: HOSPADM

## 2023-10-31 RX ORDER — TRAMADOL HYDROCHLORIDE 50 MG/1
50 TABLET ORAL EVERY 4 HOURS PRN
Status: DISCONTINUED | OUTPATIENT
Start: 2023-10-31 | End: 2023-11-03 | Stop reason: HOSPADM

## 2023-10-31 RX ORDER — ACETAMINOPHEN 325 MG/1
650 TABLET ORAL EVERY 6 HOURS SCHEDULED
Status: DISCONTINUED | OUTPATIENT
Start: 2023-10-31 | End: 2023-11-03 | Stop reason: HOSPADM

## 2023-10-31 RX ORDER — FLUMAZENIL 0.1 MG/ML
0.5 INJECTION INTRAVENOUS ONCE
Status: DISCONTINUED | OUTPATIENT
Start: 2023-10-31 | End: 2023-11-01

## 2023-10-31 RX ORDER — HEPARIN SODIUM 1000 [USP'U]/ML
2000 INJECTION, SOLUTION INTRAVENOUS; SUBCUTANEOUS PRN
Status: DISCONTINUED | OUTPATIENT
Start: 2023-10-31 | End: 2023-11-02

## 2023-10-31 RX ORDER — FENTANYL CITRATE 50 UG/ML
INJECTION, SOLUTION INTRAMUSCULAR; INTRAVENOUS
Status: COMPLETED
Start: 2023-10-31 | End: 2023-10-31

## 2023-10-31 RX ORDER — HEPARIN SODIUM 10000 [USP'U]/100ML
5-30 INJECTION, SOLUTION INTRAVENOUS CONTINUOUS
Status: DISCONTINUED | OUTPATIENT
Start: 2023-10-31 | End: 2023-11-02

## 2023-10-31 RX ADMIN — GABAPENTIN 600 MG: 300 CAPSULE ORAL at 16:01

## 2023-10-31 RX ADMIN — OXYCODONE AND ACETAMINOPHEN 1 TABLET: 5; 325 TABLET ORAL at 02:06

## 2023-10-31 RX ADMIN — SENNOSIDES 17.2 MG: 8.6 TABLET, FILM COATED ORAL at 20:19

## 2023-10-31 RX ADMIN — DOCUSATE SODIUM 100 MG: 100 CAPSULE, LIQUID FILLED ORAL at 20:19

## 2023-10-31 RX ADMIN — DOCUSATE SODIUM 100 MG: 100 CAPSULE, LIQUID FILLED ORAL at 08:40

## 2023-10-31 RX ADMIN — CYCLOBENZAPRINE 10 MG: 10 TABLET, FILM COATED ORAL at 16:01

## 2023-10-31 RX ADMIN — HEPARIN SODIUM 12 UNITS/KG/HR: 10000 INJECTION, SOLUTION INTRAVENOUS at 17:56

## 2023-10-31 RX ADMIN — PIPERACILLIN AND TAZOBACTAM 3375 MG: 3; .375 INJECTION, POWDER, LYOPHILIZED, FOR SOLUTION INTRAVENOUS at 13:05

## 2023-10-31 RX ADMIN — ACETAMINOPHEN 650 MG: 325 TABLET ORAL at 13:02

## 2023-10-31 RX ADMIN — GABAPENTIN 600 MG: 300 CAPSULE ORAL at 20:19

## 2023-10-31 RX ADMIN — PANTOPRAZOLE SODIUM 40 MG: 40 TABLET, DELAYED RELEASE ORAL at 08:40

## 2023-10-31 RX ADMIN — OXYCODONE AND ACETAMINOPHEN 2 TABLET: 5; 325 TABLET ORAL at 08:48

## 2023-10-31 RX ADMIN — GABAPENTIN 600 MG: 300 CAPSULE ORAL at 08:41

## 2023-10-31 RX ADMIN — ACETAMINOPHEN 650 MG: 325 TABLET ORAL at 17:57

## 2023-10-31 RX ADMIN — MIDAZOLAM HYDROCHLORIDE 2 MG: 1 INJECTION INTRAMUSCULAR; INTRAVENOUS at 14:13

## 2023-10-31 RX ADMIN — PIPERACILLIN AND TAZOBACTAM 3375 MG: 3; .375 INJECTION, POWDER, LYOPHILIZED, FOR SOLUTION INTRAVENOUS at 21:24

## 2023-10-31 RX ADMIN — FENTANYL CITRATE 100 MCG: 50 INJECTION, SOLUTION INTRAMUSCULAR; INTRAVENOUS at 13:58

## 2023-10-31 RX ADMIN — CYCLOBENZAPRINE 10 MG: 10 TABLET, FILM COATED ORAL at 20:19

## 2023-10-31 RX ADMIN — PIPERACILLIN AND TAZOBACTAM 3375 MG: 3; .375 INJECTION, POWDER, LYOPHILIZED, FOR SOLUTION INTRAVENOUS at 06:00

## 2023-10-31 RX ADMIN — CYCLOBENZAPRINE 10 MG: 10 TABLET, FILM COATED ORAL at 08:40

## 2023-10-31 RX ADMIN — MIDAZOLAM 2 MG: 1 INJECTION INTRAMUSCULAR; INTRAVENOUS at 14:13

## 2023-10-31 RX ADMIN — POLYETHYLENE GLYCOL (3350) 17 G: 17 POWDER, FOR SOLUTION ORAL at 08:40

## 2023-10-31 ASSESSMENT — ENCOUNTER SYMPTOMS
COLOR CHANGE: 0
WHEEZING: 0
SORE THROAT: 0
CHEST TIGHTNESS: 0
VOMITING: 0
ABDOMINAL DISTENTION: 0
EYE REDNESS: 0
DIARRHEA: 0
SHORTNESS OF BREATH: 0
ABDOMINAL PAIN: 0
NAUSEA: 0
COUGH: 0
CONSTIPATION: 1
BACK PAIN: 0
SHORTNESS OF BREATH: 1

## 2023-10-31 ASSESSMENT — PAIN DESCRIPTION - LOCATION
LOCATION: HIP
LOCATION: LEG

## 2023-10-31 ASSESSMENT — PAIN SCALES - GENERAL
PAINLEVEL_OUTOF10: 5
PAINLEVEL_OUTOF10: 0
PAINLEVEL_OUTOF10: 8
PAINLEVEL_OUTOF10: 0

## 2023-10-31 ASSESSMENT — PAIN DESCRIPTION - ORIENTATION
ORIENTATION: LEFT
ORIENTATION: LEFT

## 2023-10-31 ASSESSMENT — PAIN DESCRIPTION - ONSET: ONSET: PROGRESSIVE

## 2023-10-31 ASSESSMENT — PAIN DESCRIPTION - DESCRIPTORS: DESCRIPTORS: SHOOTING

## 2023-10-31 ASSESSMENT — PAIN DESCRIPTION - FREQUENCY: FREQUENCY: INTERMITTENT

## 2023-10-31 ASSESSMENT — PAIN - FUNCTIONAL ASSESSMENT: PAIN_FUNCTIONAL_ASSESSMENT: PREVENTS OR INTERFERES SOME ACTIVE ACTIVITIES AND ADLS

## 2023-10-31 NOTE — ADT AUTH CERT
10/26   Last Updated by Kyle Read RN on 10/28/2023 0840     Review Status Created By   In Juan Valdivia RN       Review Type Associated Date   -- 10/28/2023      Criteria Review   DATE: 10/26      PERTINENT UPDATES:  Patient seen today, resting in bed. Therapy in to work with patient. Attempted EOB but patient did not tolerate due to severe pain and numbness intensifying with sitting up. recommend SNF as patient cannot tolerate 3 hours a day of therapy. Pain control with norco, flexeril, gabapentin   Percocet added to pain regimen q4h PRN      VITALS:  98.0, HR 89, RR 16, /78, 94% on NC 2LPM  Intake  700 ml  Output 1075 ml  Net       -375 ml        ABNL/PERTINENT LABS/RADIOLOGY/DIAGNOSTIC STUDIES:  No labs or imaging this care date     PHYSICAL EXAM:  Incision:  dressing in place, clean, dry, intact      Lower Extremity Motor : Bilateral lower extremity exam shows no significant midline or paraspinous tenderness. Bilateral lower extremity show skin warm dry and intact. Right anterior hip dressing is clean dry and intact. Pulses +2 at the ankles. Normal sensation throughout the L2-S1 dermatomes bilateral light touch. Calves are nontender without evidence of DVT. On strength exam she does show weakness of the left side with a great toe extension graded 4/5 and left-sided foot dorsiflexion graded 4/5. Otherwise she maintains 5/5 motor strength. MD CONSULTS/ASSESSMENT AND PLAN:  IM note:  Continue Miralax and senna for constipation, add Enemeez if no BM today  Discussed need for lumbar XR if patient can tolerate. Discussed not appropriate for IPR due to therapy intolerance. Pain: norco, flexeril, gabapentin   WBAT LLE  TLSO for OOB  Xarelto for DVT prophylaxis  Lumbar XR ordered with brace on per Ortho Spine  Continue Miralax and senna for constipation, add Enemeez if no BM today     Ortho note:  Continued LBP and BLE (L>R) radiculopathy. LSO brace intact.  Notes severe pain and was unable to stand or get out of bed. Once able to get out of bed. Will need standing xray of LS with brace intact for evaluate alignment. With the patient's recent right hip surgery, we will need to postpone the possible lumbar surgery. This was discussed with the patient in full detail by both Dr. Ardis Sacks and myself. We will continue with current therapy and assess for SNF versus inpatient rehab.   Will adjust pain regimen     MEDICATIONS:  Colace 100mg PO BID  Enemeez 238mg RE x 1  Neurontin 300mg PO TID changed to 600mg PO TID  Glycolax 17g PO daily  Xarelto 10mg PO daily  Senokot 17.2mg PO qhs  Valium 5mg PO q6h PRN x 1  Morphine 2mg IV q2h PRN x 1  Percocet 5/325mg PO q4h PRN x 1  Percocet 5/325mg 2 tabs PO q4h PRN x 1

## 2023-10-31 NOTE — PROGRESS NOTES
Patient:  Faizan Sims    Unit/Bed:4D-06/006-A  MRN: [id number]   PCP: Iván Wilkes MD  Date of Admission: 10/24/2023    Assessment and Plan(All pulmonary edema, renal failure, PE, and respiratory failure diagnoses are acute in nature unless otherwise specified):        Acute hypoxic respiratory failure: Required BiPAP support, transitioned back to HF NC. Doing well with normalization of respiratory rate, and down-trending FiO2 requirement (currently 40%). Had significantly elevated Aa gradient indicating VQ mismatch. Primary differential is atelectasis in setting of limited mobility post-surgery from persistent severe pain. Follow CXR. PT/OT today. Pulm hygiene. Pain is controlled today. Consideration for effects of sepsis from UTI. No signs of pneumonia. Procal was essentially negative. Only mild leukocytosis. Continue antibiotics. ACS unlikely. ECG negative for acute ischemia despite troponin elevation. Repeat troponin down-trended. TTE reviewed. No pulmonary edema on CXR seen. No chest pain. Negative for PE and DVT on imaging. TTE findings today of significant right to left shunt could certainly be contributing. Unclear how much this is playing into patient's current hypoxia concerns, as it has likely been going on chronically. Structural heart team consulted. Plan for DANNY. UTI: 2/2 E. Coli and Pseudomonas aeruginosa. High colony counts and high WBC on UA. Sensitivities reviewed. On Zosyn day #2. Moderate intracardiac shunt / PFO: suggested on TTE. Unclear to what degree this is playing a role in patient's current hypoxia. Discussed with structural cardiologist Dr. Jeremias Cary. Plan for DANNY today for further evaluation. Patient consented to proceed after discussing risks/benefits. Abnormal gallbladder: GB wall distention on CT abd/pelvis overnight, with noted cholelithiasis and elevated Alk phos. Negative grier's sign. Send GGT. Obtain RUQ U/S.  Plan for HIDA scan to rule out cholecystitis in 2 components of medical care. Case discussed with NP. Will defer right to left shunt intervention vs observation to structural heart. Risk of persistent hypoxemia and increased stroke risk noted. Continue antibiotics. .  Italicized font, if present,  represents changes to the note made by me. CC time 35 minutes. Time was discontiguous. Time does not include procedure. Time does include my direct assessment of the patient and coordination of care. Time represents more than 50% of the time involved with patient care by the [address] [place] [address] team.  Electronically signed by Elvira Gonzalez.  Yoan Brennan MD.

## 2023-10-31 NOTE — PROGRESS NOTES
Pt was in bed and alone at the time of the visit. She was dealing with lumbar compression fracture. She was encouraged and blessed.     10/31/23 1358   Encounter Summary   Service Provided For: Patient   Referral/Consult From: [address] Family members   Last Encounter  10/31/23   Complexity of Encounter Low   Begin Time 1040   End Time  1046   Total Time Calculated 6 min   Spiritual/Emotional needs   Type Spiritual Support   Assessment/Intervention/Outcome   Assessment Hopeful   Intervention Empowerment   Outcome Encouraged

## 2023-10-31 NOTE — PROGRESS NOTES
[facility]  INPATIENT PHYSICAL THERAPY  DAILY NOTE  [facility] ICU 4D - 4D-06/006-A    Time In: 1107  Time Out: 1130  Timed Code Treatment Minutes: 23 Minutes  Minutes: 23          Date: 10/31/2023  Patient Name: Leigh Galeazzi,  Gender:  female        MRN: [id number]  : 1960  (61 y.o.)     Referring Practitioner: AUBREY Allen - CNP  Diagnosis: Lumbar compression fracture, closed, initial encounter  Additional Pertinent Hx: Per EMr \"61 y.o. female who presents as a transfer from [facility] for further evaluation of severe hip and left leg radicular pain, lumbar pain. Underwent R DA PITO with Dr Daryl Fisher 10/19/23, noted significant low back and hip pain which increased with activity in post operative therapy course. She was discharged and later returned to the ED due to lack of pain control and inability to ambulate. Imaging revealed changes at C3, L5-S1, transfer was initiated for further evaluation by spine team. Her right hip pain is well controlled at this time, paresthesias predominately at the LLE and L low back. No bowel or bladder disturbances. Does admit to some weakness overall post operatively. \" Imaging showed Acute nondisplaced fracture anterior inferior T11 vertebral body. MRI was completed of the lumbar spine which did show extension type soft tissue injury with a L5 pars defect the L5-S1 level. Ortho treating nonsurgically at this time. LSO to be worn out of bed. Overnight 10/29/23 patient oxygen saturation worsened and she required HFNC 60 lpm, 100% FiO2, CTA chest showed no PE. Hospitalist called and asked for the patient to be transferred to ICU for treatment and evaluation of hypoxia; Patient transferred down to ICU, when I saw her the patient is not in distress on BiPAP. B Doppler 10/30 negative for DVT. Pt with UTI, CT 10/30 shows colitis and gall bladder distention, to have US.      Prior Level of Function:  Lives With: Spouse  Type of Home: House  Home Layout: Minimal Assistance, X 1, cues for hand placement, with verbal cues  Stand to Sit:Minimal Assistance, X 1, cues for hand placement, with verbal cues    Ambulation:  Minimal Assistance, X 1  Distance: 2 feet to recliner  Surface: Level Tile  Device:Rolling Walker  Gait Deviations: Forward Flexed Posture, Decreased Step Length Bilaterally, Decreased Weight Shift Bilaterally, Decreased Gait Speed, and Decreased Heel Strike Bilaterally  **Cues for direction and alignment to chair    Balance:  Static Sitting Balance:  Stand By Assistance, X 1  Static Standing Balance: Minimal Assistance, X 1; RN in to assist with pericare, small amount of BM noted on chux pad upon standing  Dynamic Standing Balance: Minimal Assistance, X 1    Functional Outcome Measures: Completed  AM-PAC Inpatient Mobility without Stair Climbing Raw Score : 12  AM-PAC Inpatient without Stair Climbing T-Scale Score : 37.26    ASSESSMENT:  Assessment: Patient progressing toward established goals. Activity Tolerance:  Patient tolerance of  treatment: good. Equipment Recommendations:Equipment Needed: No  Discharge Recommendations: Subacte/Skilled Nursing Facility  Plan: Current Treatment Recommendations: Strengthening, Therapeutic activities, Safety education & training, Patient/Caregiver education & training, Endurance training, Equipment evaluation, education, & procurement, Home exercise program, Balance training, Functional mobility training, Transfer training, Gait training, Stair training, Pain management, Neuromuscular re-education  General Plan:  (6x O)    Patient Education  Patient Education: Bed Mobility, Transfers, Gait    Goals:  Patient Goals : \"be able to move\"  Short Term Goals  Time Frame for Short Term Goals: by discharge  Short Term Goal 1: Pt will demo rolling with SBA with bed flat to return home safely. Short Term Goal 2: Pt to transfer supine <--> sit SBA to enable pt to get in/out of bed.   Short Term Goal 3: Pt to transfer sit

## 2023-10-31 NOTE — PLAN OF CARE
Problem: Discharge Planning  Goal: Discharge to home or other facility with appropriate resources  Outcome: Progressing  Flowsheets (Taken 10/30/2023 2000)  Discharge to home or other facility with appropriate resources: Identify barriers to discharge with patient and caregiver     Problem: Skin/Tissue Integrity  Goal: Absence of new skin breakdown  Description: 1. Monitor for areas of redness and/or skin breakdown  2. Assess vascular access sites hourly  3. Every 4-6 hours minimum:  Change oxygen saturation probe site  4. Every 4-6 hours:  If on nasal continuous positive airway pressure, respiratory therapy assess nares and determine need for appliance change or resting period.   Outcome: Progressing     Problem: Safety - Adult  Goal: Free from fall injury  Outcome: Progressing     Problem: Pain  Goal: Verbalizes/displays adequate comfort level or baseline comfort level  Outcome: Progressing  Flowsheets  Taken 10/31/2023 0400  Verbalizes/displays adequate comfort level or baseline comfort level: Encourage patient to monitor pain and request assistance  Taken 10/31/2023 0000  Verbalizes/displays adequate comfort level or baseline comfort level: Encourage patient to monitor pain and request assistance  Taken 10/30/2023 2000  Verbalizes/displays adequate comfort level or baseline comfort level: Encourage patient to monitor pain and request assistance     Problem: Respiratory - Adult  Goal: Achieves optimal ventilation and oxygenation  Outcome: Progressing  Flowsheets (Taken 10/30/2023 2000)  Achieves optimal ventilation and oxygenation: Assess for changes in respiratory status  Goal: Clear lung sounds  Outcome: Progressing     Problem: Skin/Tissue Integrity - Adult  Goal: Skin integrity remains intact  Outcome: Progressing  Flowsheets (Taken 10/30/2023 2000)  Skin Integrity Remains Intact: Assess vascular access sites hourly     Problem: Musculoskeletal - Adult  Goal: Return mobility to safest level of

## 2023-10-31 NOTE — PROGRESS NOTES
[facility] ICU 4D  Occupational Therapy  Daily Note  Time:   Time In: 8989  Time Out: 1345  Timed Code Treatment Minutes: 13 Minutes  Minutes: 13          Date: 10/31/2023  Patient Name: Faizan Sims,   Gender: female      Room: [facility]006-A  MRN: [id number]  : 1960  (61 y.o.)  Referring Practitioner: AUBREY Richards CNP  Diagnosis: lumbar compression fracture, closed initial encounter  Additional Pertinent Hx: 61 y.o. female who presents as a transfer from [facility] for further evaluation of severe hip and left leg radicular pain, lumbar pain. Underwent R DA PITO with Dr Zoe Olivier 10/19/23, noted significant low back and hip pain which increased with activity in post operative therapy course. She was discharged and later returned to the ED due to lack of pain control and inability to ambulate. Imaging revealed changes at C3, L5-S1, transfer was initiated for further evaluation by spine team. MRI of the lumbar spine completed on the date of 10/23/2023 at [facility] again shows anterolisthesis of L5-S1, left foraminal  stenosis due to encroachment by disk bulge and facet arthropathy and  widening of the disk space at L5-S1 as well. No surgical intervention warranted at this immediate time. LSO to be worn when up. Restrictions/Precautions:  Restrictions/Precautions: Fall Risk, General Precautions  Required Braces or Orthoses  Spinal: Lumbar Corset  Spinal Other: hard LSO to be worn with all activity and when out of bed  Position Activity Restriction  Spinal Precautions: No Bending, No Lifting, No Twisting  Hip Precautions: Anterior hip precautions  Other position/activity restrictions: non-surgical as of date of eval-monitor upcoming imaging for stability of lumbar region. R PITO 10/19 by  Dr Zoe Olivier. Left sided sciatica like pain     SUBJECTIVE: Patient sitting upright in recliner upon OT arrival, agreeable to OT session.  Patient to be transferred back to bed for DANNY per nurse    PAIN: no numeric given/10: states pain has gotten increasingly better    Vitals: Blood Pressure: 117/70  Oxygen: 98% on high flow o2, 50L 59%  Heart Rate: 99    COGNITION: WNL    ADL:   Toileting: Maximum Assistance. For sugar-care . BALANCE:  Sitting Balance:  Contact Guard Assistance. Sitting EOB  Standing Balance: Stand By Assistance. Standing with walker    BED MOBILITY:  Sit to Supine: Moderate Assistance, X 1 with head of bed flat, with rail and vebral cues and increased time for completion    TRANSFERS:  To/From Bed and Chair: Minimal Assistance, X 1, with increased time for completion, cues for hand placement. FUNCTIONAL MOBILITY:  Assistive Device: Walker  Assist Level:  Minimal Assistance, X 1, with verbal cues , and with increased time for completion. Distance:  from chair to bed 2 steps  Cues for directio and alignment to chair. ASSESSMENT:     Activity Tolerance:  Patient tolerance of  treatment: Fair treatment tolerance Session limited by patient tolerance and upcoming procedure. Discharge Recommendations: Continue to assess pending progress, Not safe to return home at this time, and Inpatient Therapy Stay  Equipment Recommendations: Equipment Needed: No (continue to monitor)  Plan: Times Per Week: 6x  Times Per Day: Once a day  Current Treatment Recommendations: Strengthening, Balance training, Functional mobility training, Endurance training, Safety education & training    Patient Education  Patient Education: Role of OT, Plan of Care, and Precautions    Goals  Short Term Goals  Time Frame for Short Term Goals: until discharge  Short Term Goal 1: Pt will tolerate sitting EOB x15 min with SUP and with minimal reports of pain to prepare for out of bed activity. Short Term Goal 2: Pt will tolerate OTR assessment for functional mobility when appropriate.   Short Term Goal 3: Pt will complete sit to stand transfer with CGA x1 and minimal cues for hand

## 2023-10-31 NOTE — CARE COORDINATION
10/31/23, 2:37 PM EDT    DISCHARGE ON [address] [place] [address] day: 7  Location: -06/006-A Reason for admit: Lumbar compression fracture, closed, initial encounter ([address] ) [D93.727V]   Procedure:   10/27 L/S xray with brace on: Bones are diffusely demineralized, consistent with osteoporosis. Mild thoracolumbar levoscoliosis. Cannot exclude muscle spasm right side. No fracture seen. Disc spaces well-maintained. Question degenerative changes both sacroiliac joints which are not well demonstrated on this study  10/29 CTA Chest: No acute pulmonary embolus; Ascending aortic aneurysm 4.4 cm diameter. Follow-up as needed; Acute nondisplaced fracture anterior inferior T11 vertebral body  10/30 LLE Venous doppler: Neg for DVT  Barriers to Discharge: Ortho and Intensivist following. Per report, possible bedside DANNY. Remains on HFO40%, 60L. Afebrile. NSR. Ox4. Follows commands. PT/OT. TLSO brace when up. WBAT. Urine +nonfermenting gram neg bacilli and enteric gram negative bacilli. Telemetry, sal. Nebs. Flexeril. IV zosyn. Xarelto. Movantik. Pain control. PCP: Lisette Reed MD  Readmission Risk Score: 10.1%  Patient Goals/Plan/Treatment Preferences: From home. Not a candidate for IPR and refused ECF. Pt considering [place]. SW on case. Will require precert.

## 2023-10-31 NOTE — CONSULTS
[facility] [address] [place] [address]    Patient's Name/Date of Birth: Porsche Faria / 1960 (20 y.o.)    Date: October 31, 2023     Referring Provider: Malina Montes MD    CHIEF COMPLAINT: PFO, hypoxia      HPI: This is a pleasant 61 y.o. female hx of HTN, lumbar radiculopathy, s/p right sided PITO 10/19 on Xarelto for DVT ppx, UTI on Zosyn who is recovering in ICU but continues to have hypoxia. She is on 50L 40% high flow. CXR does not show radha edema. She was on BiPAP and then it was weaned, but now cannot be taken off. TTE shows EF 45%. Spontaneous R to L shunt in the atria seem. Bilateral atrial enlargement on CT showing ASD/PFO. ECG with NSR, LAD, inferior infarct. Cr 0.7, K 3.6, , Hgb 10.5,  Plt 278. No prior PFO seen. No hx of CVA. She is on Xarelto for post-op hip ppx. Echo: No results found for this or any previous visit. All labs, EKG's, diagnostic testing and images as well as cardiac cath, stress testing were reviewed during this encounter    Past Medical History:   Diagnosis Date    Arthritis     Hypertension      No past surgical history on file.   Current Facility-Administered Medications   Medication Dose Route Frequency Provider Last Rate Last Admin    piperacillin-tazobactam (ZOSYN) 3,375 mg in sodium chloride 0.9 % 50 mL IVPB (mini-bag)  3,375 mg IntraVENous Q8H Patagonia Caprice, APRN - CNP 12.5 mL/hr at 10/31/23 1305 3,375 mg at 10/31/23 1305    [START ON 11/1/2023] naloxegol (MOVANTIK) tablet 12.5 mg  12.5 mg Oral QAM AC Patagonia Caprice, APRN - CNP        traMADol Cleophus Pam) tablet 50 mg  50 mg Oral Q4H PRN Patagonia Caprice, APRN - CNP        acetaminophen (TYLENOL) tablet 650 mg  650 mg Oral 4 times per day Patagonia Caprice, APRN - CNP   650 mg at 10/31/23 1302    acetaminophen (TYLENOL) tablet 650 mg  650 mg Oral Q6H PRN Patagonia Caprice, APRN - CNP        ipratropium 0.5 mg-albuterol 2.5 mg (DUONEB) nebulizer solution 1 Dose  1 Dose Inhalation Q4H PRN Sudheer Han for allowing us to participate in the care of this patient. Please do not hesitate to call us with questions. Time spent reviewing notes, data, discussing with patient/family, and formulating plan with clinical documentation was approximately 80 minutes.      Electronically signed by Juan Bonilla MD on 10/31/2023 at 1:36 PM    Interventional Cardiology - [facility]

## 2023-11-01 ENCOUNTER — APPOINTMENT (OUTPATIENT)
Dept: INTERVENTIONAL RADIOLOGY/VASCULAR | Age: 63
DRG: 551 | End: 2023-11-01
Attending: ORTHOPAEDIC SURGERY
Payer: COMMERCIAL

## 2023-11-01 LAB
ANION GAP SERPL CALC-SCNC: 11 MEQ/L (ref 8–16)
APTT PPP: 39.4 SECONDS (ref 22–38)
APTT PPP: 61.8 SECONDS (ref 22–38)
APTT PPP: 64.3 SECONDS (ref 22–38)
APTT PPP: 71.3 SECONDS (ref 22–38)
BUN SERPL-MCNC: 17 MG/DL (ref 7–22)
CALCIUM SERPL-MCNC: 8.7 MG/DL (ref 8.5–10.5)
CHLORIDE SERPL-SCNC: 100 MEQ/L (ref 98–111)
CO2 SERPL-SCNC: 26 MEQ/L (ref 23–33)
CREAT SERPL-MCNC: 0.7 MG/DL (ref 0.4–1.2)
DEPRECATED RDW RBC AUTO: 50.5 FL (ref 35–45)
EKG ATRIAL RATE: 88 BPM
EKG P AXIS: 34 DEGREES
EKG P-R INTERVAL: 162 MS
EKG Q-T INTERVAL: 400 MS
EKG QRS DURATION: 88 MS
EKG QTC CALCULATION (BAZETT): 484 MS
EKG R AXIS: -45 DEGREES
EKG T AXIS: 25 DEGREES
EKG VENTRICULAR RATE: 88 BPM
ERYTHROCYTE [DISTWIDTH] IN BLOOD BY AUTOMATED COUNT: 14.3 % (ref 11.5–14.5)
GFR SERPL CREATININE-BSD FRML MDRD: > 60 ML/MIN/1.73M2
GLUCOSE SERPL-MCNC: 102 MG/DL (ref 70–108)
HCT VFR BLD AUTO: 31.1 % (ref 37–47)
HGB BLD-MCNC: 10 GM/DL (ref 12–16)
MAGNESIUM SERPL-MCNC: 2.2 MG/DL (ref 1.6–2.4)
MCH RBC QN AUTO: 31.4 PG (ref 26–33)
MCHC RBC AUTO-ENTMCNC: 32.2 GM/DL (ref 32.2–35.5)
MCV RBC AUTO: 97.8 FL (ref 81–99)
PLATELET # BLD AUTO: 248 THOU/MM3 (ref 130–400)
PMV BLD AUTO: 11 FL (ref 9.4–12.4)
POTASSIUM SERPL-SCNC: 3.5 MEQ/L (ref 3.5–5.2)
RBC # BLD AUTO: 3.18 MILL/MM3 (ref 4.2–5.4)
SODIUM SERPL-SCNC: 137 MEQ/L (ref 135–145)
WBC # BLD AUTO: 8.4 THOU/MM3 (ref 4.8–10.8)

## 2023-11-01 PROCEDURE — 6370000000 HC RX 637 (ALT 250 FOR IP): Performed by: NURSE PRACTITIONER

## 2023-11-01 PROCEDURE — 6370000000 HC RX 637 (ALT 250 FOR IP): Performed by: PHYSICIAN ASSISTANT

## 2023-11-01 PROCEDURE — 83735 ASSAY OF MAGNESIUM: CPT

## 2023-11-01 PROCEDURE — 85730 THROMBOPLASTIN TIME PARTIAL: CPT

## 2023-11-01 PROCEDURE — 6370000000 HC RX 637 (ALT 250 FOR IP): Performed by: STUDENT IN AN ORGANIZED HEALTH CARE EDUCATION/TRAINING PROGRAM

## 2023-11-01 PROCEDURE — 51798 US URINE CAPACITY MEASURE: CPT

## 2023-11-01 PROCEDURE — 36415 COLL VENOUS BLD VENIPUNCTURE: CPT

## 2023-11-01 PROCEDURE — 6360000002 HC RX W HCPCS: Performed by: NURSE PRACTITIONER

## 2023-11-01 PROCEDURE — 2060000000 HC ICU INTERMEDIATE R&B

## 2023-11-01 PROCEDURE — 93971 EXTREMITY STUDY: CPT

## 2023-11-01 PROCEDURE — 2580000003 HC RX 258: Performed by: NURSE PRACTITIONER

## 2023-11-01 PROCEDURE — 85027 COMPLETE CBC AUTOMATED: CPT

## 2023-11-01 PROCEDURE — 51701 INSERT BLADDER CATHETER: CPT

## 2023-11-01 PROCEDURE — 99233 SBSQ HOSP IP/OBS HIGH 50: CPT | Performed by: INTERNAL MEDICINE

## 2023-11-01 PROCEDURE — 80048 BASIC METABOLIC PNL TOTAL CA: CPT

## 2023-11-01 RX ORDER — POTASSIUM CHLORIDE 7.45 MG/ML
10 INJECTION INTRAVENOUS PRN
Status: DISCONTINUED | OUTPATIENT
Start: 2023-11-01 | End: 2023-11-03 | Stop reason: HOSPADM

## 2023-11-01 RX ORDER — MAGNESIUM SULFATE IN WATER 40 MG/ML
2000 INJECTION, SOLUTION INTRAVENOUS PRN
Status: DISCONTINUED | OUTPATIENT
Start: 2023-11-01 | End: 2023-11-03 | Stop reason: HOSPADM

## 2023-11-01 RX ORDER — HYDROCHLOROTHIAZIDE 25 MG/1
12.5 TABLET ORAL DAILY
Status: DISCONTINUED | OUTPATIENT
Start: 2023-11-01 | End: 2023-11-03 | Stop reason: HOSPADM

## 2023-11-01 RX ORDER — POTASSIUM CHLORIDE 20 MEQ/1
40 TABLET, EXTENDED RELEASE ORAL PRN
Status: DISCONTINUED | OUTPATIENT
Start: 2023-11-01 | End: 2023-11-03 | Stop reason: HOSPADM

## 2023-11-01 RX ADMIN — CYCLOBENZAPRINE 10 MG: 10 TABLET, FILM COATED ORAL at 08:38

## 2023-11-01 RX ADMIN — ACETAMINOPHEN 650 MG: 325 TABLET ORAL at 14:37

## 2023-11-01 RX ADMIN — CYCLOBENZAPRINE 10 MG: 10 TABLET, FILM COATED ORAL at 14:37

## 2023-11-01 RX ADMIN — DOCUSATE SODIUM 100 MG: 100 CAPSULE, LIQUID FILLED ORAL at 08:37

## 2023-11-01 RX ADMIN — GABAPENTIN 600 MG: 300 CAPSULE ORAL at 14:38

## 2023-11-01 RX ADMIN — POTASSIUM CHLORIDE 40 MEQ: 1500 TABLET, EXTENDED RELEASE ORAL at 09:56

## 2023-11-01 RX ADMIN — POLYETHYLENE GLYCOL (3350) 17 G: 17 POWDER, FOR SOLUTION ORAL at 08:39

## 2023-11-01 RX ADMIN — PIPERACILLIN AND TAZOBACTAM 3375 MG: 3; .375 INJECTION, POWDER, LYOPHILIZED, FOR SOLUTION INTRAVENOUS at 16:21

## 2023-11-01 RX ADMIN — DOCUSATE SODIUM 100 MG: 100 CAPSULE, LIQUID FILLED ORAL at 19:39

## 2023-11-01 RX ADMIN — HEPARIN SODIUM 2000 UNITS: 1000 INJECTION INTRAVENOUS; SUBCUTANEOUS at 00:56

## 2023-11-01 RX ADMIN — ACETAMINOPHEN 650 MG: 325 TABLET ORAL at 00:35

## 2023-11-01 RX ADMIN — ACETAMINOPHEN 650 MG: 325 TABLET ORAL at 04:15

## 2023-11-01 RX ADMIN — PIPERACILLIN AND TAZOBACTAM 3375 MG: 3; .375 INJECTION, POWDER, LYOPHILIZED, FOR SOLUTION INTRAVENOUS at 04:16

## 2023-11-01 RX ADMIN — HYDROCHLOROTHIAZIDE 12.5 MG: 25 TABLET ORAL at 10:09

## 2023-11-01 RX ADMIN — GABAPENTIN 600 MG: 300 CAPSULE ORAL at 08:42

## 2023-11-01 RX ADMIN — GABAPENTIN 600 MG: 300 CAPSULE ORAL at 21:00

## 2023-11-01 RX ADMIN — HEPARIN SODIUM 15 UNITS/KG/HR: 10000 INJECTION, SOLUTION INTRAVENOUS at 16:20

## 2023-11-01 RX ADMIN — PANTOPRAZOLE SODIUM 40 MG: 40 TABLET, DELAYED RELEASE ORAL at 05:55

## 2023-11-01 RX ADMIN — ACETAMINOPHEN 650 MG: 325 TABLET ORAL at 19:39

## 2023-11-01 RX ADMIN — CYCLOBENZAPRINE 10 MG: 10 TABLET, FILM COATED ORAL at 19:38

## 2023-11-01 RX ADMIN — TRAMADOL HYDROCHLORIDE 50 MG: 50 TABLET, COATED ORAL at 08:38

## 2023-11-01 RX ADMIN — NALOXEGOL OXALATE 12.5 MG: 12.5 TABLET, FILM COATED ORAL at 05:55

## 2023-11-01 ASSESSMENT — ENCOUNTER SYMPTOMS
DIARRHEA: 0
COLOR CHANGE: 0
CHEST TIGHTNESS: 0
EYE REDNESS: 0
VOMITING: 0
WHEEZING: 0
SHORTNESS OF BREATH: 0
ABDOMINAL PAIN: 0
BACK PAIN: 0
CONSTIPATION: 0
ABDOMINAL DISTENTION: 0
COUGH: 0
NAUSEA: 0
SORE THROAT: 0

## 2023-11-01 ASSESSMENT — PAIN SCALES - GENERAL
PAINLEVEL_OUTOF10: 6
PAINLEVEL_OUTOF10: 0
PAINLEVEL_OUTOF10: 8
PAINLEVEL_OUTOF10: 5
PAINLEVEL_OUTOF10: 2

## 2023-11-01 ASSESSMENT — PAIN DESCRIPTION - PAIN TYPE
TYPE: SURGICAL PAIN
TYPE: SURGICAL PAIN

## 2023-11-01 ASSESSMENT — PAIN DESCRIPTION - DESCRIPTORS
DESCRIPTORS: ACHING
DESCRIPTORS: SHOOTING

## 2023-11-01 ASSESSMENT — PAIN DESCRIPTION - ORIENTATION
ORIENTATION: LEFT

## 2023-11-01 ASSESSMENT — PAIN DESCRIPTION - LOCATION
LOCATION: BACK
LOCATION: BACK
LOCATION: LEG
LOCATION: LEG
LOCATION: HIP

## 2023-11-01 ASSESSMENT — PAIN DESCRIPTION - FREQUENCY: FREQUENCY: INTERMITTENT

## 2023-11-01 NOTE — PROGRESS NOTES
Low Risk Nutrition Note      Recommendations/Plan:  Continue diet and bowel regimen as per Physician. Recommend MVI    Nutrition Assessment:     pt. Seen this am for LOS nutrition re-evaluation. Eating 100% of meals per pt. when not NPO for procedures - several snacks at bedside also. THR done at [facility] 10/19 then admit to [facility] 10/24; plan HIDA to R/O cholecystistis; BM x3 past 24h with bowel regimen on board; off and on bipap/HFNC this admit but still eating well with those modalities; TLSO brace on; reviewed high quality protein sources for healing process; pt. Declines need for ONS but did discuss options for home if needed    Reason for Visit:  Initial, RD Nutrition Re-Screen/LOS    Current Nutrition Therapies:  ADULT DIET; Regular; Low Fat/Low Chol/High Fiber/GIAN    Height:  154.9 cm (5' 1\")    Current Body Weight:  78 kg (172 lb) (11/1 with non pitting edema)       Diagnosis:  No nutrition diagnosis at this time. Monitoring and Evaluation:  Patient will be monitored per nutrition standards of care. Consult Dietitian if nutrition intervention essential to patient care is needed.      Discharge Planning:  No needs      Electronically signed by Rosy Portillo, CRESENCIO, LD on 11/1/23 at 2:44 PM EDT    Contact:  [phone]

## 2023-11-01 NOTE — CARE COORDINATION
11/1/23, 2:20 PM EDT    [address] day: 8  Location: 4A-07/007-A Reason for admit: Lumbar compression fracture, closed, initial encounter ([address] ) [V06.028B]   Procedure:   10/27 L/S xray with brace on: Bones are diffusely demineralized, consistent with osteoporosis. Mild thoracolumbar levoscoliosis. Cannot exclude muscle spasm right side. No fracture seen. Disc spaces well-maintained. Question degenerative changes both sacroiliac joints which are not well demonstrated on this study  10/29 CTA Chest: No acute pulmonary embolus; Ascending aortic aneurysm 4.4 cm diameter. Follow-up as needed; Acute nondisplaced fracture anterior inferior T11 vertebral body  10/30 LLE Venous doppler: Neg for DVT  10/31 DANNY done; report not available at this time    Barriers to Discharge: DANNY done yesterday, report not available at this time. Transferred to stepdown this shift. On room air. Afebrile. NSR. Ox4. Follows commands. PT/OT. TLSO brace when up. WBAT. Hospitalist and Ortho following. Urine +nonfermenting gram neg bacilli and enteric gram negative bacilli. Telemetry, external urinary catheter. Heparin drip, tylenol, Flexeril, prn valium, colace, neurontin, hydrodiuril, nebs, movantik, protonix, IV zosyn, glycolax, senna, prn ultram, Electrolyte replacement protocols. Hgb 10. INR 1.28. PCP: Vinh Lagunas MD  Readmission Risk Score: 10.7%  Patient Goals/Plan/Treatment Preferences: From home. Not a candidate for IPR and refused ECF. Pt considering [place]. SW on case. Will require precert.

## 2023-11-01 NOTE — PROGRESS NOTES
[facility]  PHYSICAL THERAPY MISSED TREATMENT NOTE  [facility] NEUROSCIENCES 4A    Date: 2023  Patient Name: Brayan Swann        MRN: [id number]   : 1960  (61 y.o.)  Gender: female   Referring Practitioner: AUBREY Mcdonough CNP  Diagnosis: Lumbar compression fracture, closed, initial encounter         REASON FOR MISSED TREATMENT:  Missed Treat. Pt off unit for LE Doppler, will hold.

## 2023-11-01 NOTE — FLOWSHEET NOTE
11/01/23 1608   Safe Environment   Safety Measures Other (comment)  (Virtual Nurse Safety Round)     VN called into patients room and introduced myself and role. Patient did not answer. Video activated for safety check. Patient not in room.

## 2023-11-01 NOTE — PLAN OF CARE
CC: Acute hypoxic respiratory failure    H&P  The patient is a 77-year-old female with a PMH of HTN and arthritis who presented to [facility] for intractable pain. She initially underwent a right-sided PITO on 10/19/2023. Her postop course was complicated by severe hip pain, left leg radicular pain and lumbar back pain. At that time a CT and MRI lumbar spine demonstrated L5 pars defect at the L5-S1 level with widening of the disc space and left neural foraminal narrowing. She was given a brace for support. However she represented to the ED 10/24 due to persistent pain and inability to ambulate. She was started on Flexeril and Neurontin along with as needed narcotics. The patient then started to exhibit increasing O2 requirements, escalated from 4 L NC to HFNC 10/29. CXR was nonsignificant for acute process. CTA chest was negative for PE. Pulmonology was consulted and an ABG showed respiratory alkalosis. The patient was started on BiPAP and transferred to ICU for concerns of progressive respiratory failure requiring intubation. A TTE demonstrated findings for PFO with confirmation by DANNY showing a moderate intracardiac shunt 10/31. The patient continued on BiPAP with improvement in her respiratory status. Additionally, the patient was found to have UTI secondary to E. coli and Pseudomonas. She was started on antibiotics. She was transferred to ICU stepdown 11/1. PE:  General appearance: No apparent distress, appears stated age and cooperative. HEENT: Pupils equal, round, and reactive to light. Conjunctivae/corneas clear. Neck: Supple, with full range of motion. No jugular venous distention. Trachea midline. Respiratory:  Normal respiratory effort. Clear to auscultation, bilaterally without Rales/Wheezes/Rhonchi. Cardiovascular: Regular rate and rhythm with normal S1/S2 without murmurs, rubs or gallops. Abdomen: Soft, non-tender, non-distended with normal bowel sounds.   Musculoskeletal: passive and for acute cardiopulmonary process. CTA chest negative for PE. On anticoagulation. Elevated Aa gradient indicative of VQ mismatch. Suspected intrapulmonary shunt, TTE and DANNY demonstrate intracardiac shunt/ASD. Weaned off to room air.

## 2023-11-01 NOTE — PROGRESS NOTES
Patient:  Fahad Escalera    Unit/Bed:4D-06/006-A  MRN: [id number]   PCP: Vinh Lagunas MD  Date of Admission: 10/24/2023    Assessment and Plan(All pulmonary edema, renal failure, PE, and respiratory failure diagnoses are acute in nature unless otherwise specified):        UTI: 2/2 E. Coli and Pseudomonas aeruginosa. High colony counts and high WBC on UA. Sensitivities reviewed. On Zosyn day #3. Moderate intracardiac shunt / PFO: suggested on TTE, confirmed on DANNY 10/31. Discussed with structural cardiologist Dr. Dianne Mcgarry. Unclear to what degree this is playing a role in patient's hypoxia. Cardiac catheterization was being considered for possible closure, if hypoxia persisted. However, she has maintained SpO2 >95% for >12 hours after removal of oxygen. Structural heart/cardiology following; awaiting further rec's. Will need further discussions on stroke risk and management between cardiology and the patient. For now, on heparin infusion pending any further procedural plans by cardio. LLE U/S on 10/30/23 was negative for DVT. Will confirm no RLE DVT with U/S; ordered. Abnormal gallbladder: GB wall distention on CT abd/pelvis, with noted cholelithiasis and elevated Alk phos. Negative grier's sign. Elevated GGT. RUQ U/S positive for mild GB distention, CBD dilation. Plan for HIDA scan 11/2/23. NPO at midnight. Hold narcotics. NPO after midnight. L5 Pars Defect / Stress fracture: orthopedics managing conservatively. LSO brace. PT/OT. WBAT. Pain control per ortho. 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 EF 45% on echo. Appears compensated. No pulmonary nor peripheral edema. Pro-BNP wnl. ACS ruled out as above, with down-trended troponin, lack of ECG changes, and no chest pain. Will need eventual ischemic workup. Will need GDMT, starting Hctz today. Will need cardiology follow-up for further evaluation. Chronic kyphosis: ?ankylosing spondylitis history.  Prior HLA- antigen B27 positive in Aortic aneurysm measures 4.7 cm. There is moderate degree of right to left shunt on bubble study using agitated saline suggestive for PFO (Patent Foramen Ovale)  Na 137 K 3.5 Cl 100 CO2 26 BUN 17 Cr 0.7 AG 11.0 Gluc 102 Ca 8.7    WBC 8.4 Hgb 10.0 Hct 31.1 Plt 248  HLA Antigen B27 10/31/23: pending. Urine culture 10/29/23: pseudomonas aeruginosa, cfu 50-90k. E. Coli, cfu 50-90k. MRSA nares PCR 10/29/23: negative. VL dup LE venous left 10/30/23 report: No evidence of a DVT. CTA chest 10/29/23 report: No acute pulmonary embolus. Ascending aortic aneurysm 4.4 cm diameter. Follow-up as needed. Acute nondisplaced fracture anterior inferior T11 vertebral body. CXR 10/29/23 report: Stable radiographic appearance of the chest. No evidence of an acute process. CT abd/pelvis 10/30/23 report: L5 and S1 fractures as above, not well seen. Cholelithiasis with nonspecific gallbladder distention, consider ultrasound. Nonspecific acute colitis involving rectosigmoid colon with mild sigmoid dilation. RUQ ultrasound 10/31/23 report: Mild gallbladder distention. Cholelithiasis. The common bile duct is mildly dilated at 8 mm. Case discussed with Dr. Ivan Pedro and Dr. Silvestre Salas. Electronically signed by JANNA Qureshi                                     Patient seen by me including key components of medical care. Case discussed with NP. Transition to medical service. Defer intervention of right to left shunt PFO to structural heart services and the long term sequela management. Continue antibiotics. Case discussed with Dr. Silvsetre Salas. Italicized font, if present,  represents changes to the note made by me. CC time 35 minutes. Time was discontiguous. Time does not include procedure. Time does include my direct assessment of the patient and coordination of care. Time represents more than 50% of the time involved with patient care by the [address] [place] [address] team.  Electronically signed by Dean Duong.  Ivan Pedro MD.

## 2023-11-01 NOTE — PLAN OF CARE
Problem: Discharge Planning  Goal: Discharge to home or other facility with appropriate resources  Outcome: Progressing  Flowsheets (Taken 10/31/2023 2212)  Discharge to home or other facility with appropriate resources:   Identify discharge learning needs (meds, wound care, etc)   Refer to discharge planning if patient needs post-hospital services based on physician order or complex needs related to functional status, cognitive ability or social support system   Identify barriers to discharge with patient and caregiver   Arrange for needed discharge resources and transportation as appropriate     Problem: Skin/Tissue Integrity  Goal: Absence of new skin breakdown  Description: 1. Monitor for areas of redness and/or skin breakdown  2. Assess vascular access sites hourly  3. Every 4-6 hours minimum:  Change oxygen saturation probe site  4. Every 4-6 hours:  If on nasal continuous positive airway pressure, respiratory therapy assess nares and determine need for appliance change or resting period. Outcome: Progressing  Note: No new skin breakdown noted this shift. Patient turned and repositioned every 2 hours and PRN.         Problem: Safety - Adult  Goal: Free from fall injury  Outcome: Progressing  Flowsheets (Taken 10/31/2023 2212)  Free From Fall Injury:   Instruct family/caregiver on patient safety   Based on caregiver fall risk screen, instruct family/caregiver to ask for assistance with transferring infant if caregiver noted to have fall risk factors     Problem: Pain  Goal: Verbalizes/displays adequate comfort level or baseline comfort level  Outcome: Progressing  Flowsheets (Taken 10/31/2023 2212)  Verbalizes/displays adequate comfort level or baseline comfort level:   Administer analgesics based on type and severity of pain and evaluate response   Encourage patient to monitor pain and request assistance   Assess pain using appropriate pain scale   Implement non-pharmacological measures as appropriate and evaluate response   Notify Licensed Independent Practitioner if interventions unsuccessful or patient reports new pain     Problem: Respiratory - Adult  Goal: Achieves optimal ventilation and oxygenation  Outcome: Progressing  Flowsheets (Taken 10/31/2023 2212)  Achieves optimal ventilation and oxygenation:   Assess for changes in respiratory status   Position to facilitate oxygenation and minimize respiratory effort   Assess the need for suctioning and aspirate as needed   Respiratory therapy support as indicated   Assess for changes in mentation and behavior   Oxygen supplementation based on oxygen saturation or arterial blood gases   Assess and instruct to report shortness of breath or any respiratory difficulty     Problem: Respiratory - Adult  Goal: Clear lung sounds  Outcome: Progressing  Note: Lungs remain clear at this time.      Problem: Skin/Tissue Integrity - Adult  Goal: Skin integrity remains intact  Outcome: Progressing  Flowsheets  Taken 10/31/2023 2212  Skin Integrity Remains Intact: Monitor for areas of redness and/or skin breakdown  Taken 10/31/2023 2211  Skin Integrity Remains Intact: Monitor for areas of redness and/or skin breakdown     Problem: Musculoskeletal - Adult  Goal: Return mobility to safest level of function  Outcome: Progressing  Flowsheets (Taken 10/31/2023 2212)  Return Mobility to Safest Level of Function:   Assess patient stability and activity tolerance for standing, transferring and ambulating with or without assistive devices   Assist with transfers and ambulation using safe patient handling equipment as needed   Ensure adequate protection for wounds/incisions during mobilization   Obtain physical therapy/occupational therapy consults as needed   Instruct patient/family in ordered activity level     Problem: Genitourinary - Adult  Goal: Urinary catheter remains patent  Outcome: Progressing  Flowsheets (Taken 10/31/2023 2212)  Urinary catheter remains patent: Irrigate

## 2023-11-01 NOTE — PROGRESS NOTES
[facility]  OCCUPATIONAL THERAPY MISSED TREATMENT NOTE  [facility]      Date: 2023  Patient Name: Colt Pope        CSN: [id number]   : 1960  (61 y.o.)  Gender: female   Referring Practitioner: AUBREY Stevens CNP  Diagnosis: lumbar compression fracture, closed initial encounter         REASON FOR MISSED TREATMENT:  Attempted to see patient this date however patient awaiting LE doppler. Will check back as time allows.

## 2023-11-01 NOTE — SIGNIFICANT EVENT
Patient transferred to 4A step-down unit. Handoff report given to hospitalist Dr. Vitaliy Yarbrough. Discussed with Dr. Li Garcia.     Electronically signed by AUBREY Barnes CNP on 11/1/2023 at 10:42 AM

## 2023-11-02 ENCOUNTER — APPOINTMENT (OUTPATIENT)
Dept: NUCLEAR MEDICINE | Age: 63
DRG: 551 | End: 2023-11-02
Attending: ORTHOPAEDIC SURGERY
Payer: COMMERCIAL

## 2023-11-02 LAB
ALBUMIN SERPL BCG-MCNC: 3.4 G/DL (ref 3.5–5.1)
ALP SERPL-CCNC: 314 U/L (ref 38–126)
ALT SERPL W/O P-5'-P-CCNC: 10 U/L (ref 11–66)
ANION GAP SERPL CALC-SCNC: 14 MEQ/L (ref 8–16)
APTT PPP: 63.3 SECONDS (ref 22–38)
APTT PPP: 67.6 SECONDS (ref 22–38)
AST SERPL-CCNC: 17 U/L (ref 5–40)
BILIRUB CONJ SERPL-MCNC: < 0.2 MG/DL (ref 0–0.3)
BILIRUB SERPL-MCNC: 0.4 MG/DL (ref 0.3–1.2)
BUN SERPL-MCNC: 9 MG/DL (ref 7–22)
CALCIUM SERPL-MCNC: 9 MG/DL (ref 8.5–10.5)
CHLORIDE SERPL-SCNC: 101 MEQ/L (ref 98–111)
CO2 SERPL-SCNC: 23 MEQ/L (ref 23–33)
CREAT SERPL-MCNC: 0.7 MG/DL (ref 0.4–1.2)
DEPRECATED RDW RBC AUTO: 49.8 FL (ref 35–45)
ERYTHROCYTE [DISTWIDTH] IN BLOOD BY AUTOMATED COUNT: 14.4 % (ref 11.5–14.5)
GFR SERPL CREATININE-BSD FRML MDRD: > 60 ML/MIN/1.73M2
GLUCOSE SERPL-MCNC: 99 MG/DL (ref 70–108)
HCT VFR BLD AUTO: 31.2 % (ref 37–47)
HGB BLD-MCNC: 10.2 GM/DL (ref 12–16)
MCH RBC QN AUTO: 31.3 PG (ref 26–33)
MCHC RBC AUTO-ENTMCNC: 32.7 GM/DL (ref 32.2–35.5)
MCV RBC AUTO: 95.7 FL (ref 81–99)
PLATELET # BLD AUTO: 283 THOU/MM3 (ref 130–400)
PMV BLD AUTO: 11.1 FL (ref 9.4–12.4)
POTASSIUM SERPL-SCNC: 3.7 MEQ/L (ref 3.5–5.2)
PROT SERPL-MCNC: 6.4 G/DL (ref 6.1–8)
RBC # BLD AUTO: 3.26 MILL/MM3 (ref 4.2–5.4)
SODIUM SERPL-SCNC: 138 MEQ/L (ref 135–145)
WBC # BLD AUTO: 6.9 THOU/MM3 (ref 4.8–10.8)

## 2023-11-02 PROCEDURE — 6370000000 HC RX 637 (ALT 250 FOR IP): Performed by: NURSE PRACTITIONER

## 2023-11-02 PROCEDURE — 99232 SBSQ HOSP IP/OBS MODERATE 35: CPT | Performed by: STUDENT IN AN ORGANIZED HEALTH CARE EDUCATION/TRAINING PROGRAM

## 2023-11-02 PROCEDURE — 97530 THERAPEUTIC ACTIVITIES: CPT

## 2023-11-02 PROCEDURE — 1200000003 HC TELEMETRY R&B

## 2023-11-02 PROCEDURE — 97116 GAIT TRAINING THERAPY: CPT

## 2023-11-02 PROCEDURE — 78227 HEPATOBIL SYST IMAGE W/DRUG: CPT

## 2023-11-02 PROCEDURE — 3430000000 HC RX DIAGNOSTIC RADIOPHARMACEUTICAL: Performed by: NURSE PRACTITIONER

## 2023-11-02 PROCEDURE — 97110 THERAPEUTIC EXERCISES: CPT

## 2023-11-02 PROCEDURE — 97535 SELF CARE MNGMENT TRAINING: CPT

## 2023-11-02 PROCEDURE — 6370000000 HC RX 637 (ALT 250 FOR IP): Performed by: PHYSICIAN ASSISTANT

## 2023-11-02 PROCEDURE — 6370000000 HC RX 637 (ALT 250 FOR IP): Performed by: STUDENT IN AN ORGANIZED HEALTH CARE EDUCATION/TRAINING PROGRAM

## 2023-11-02 PROCEDURE — 85027 COMPLETE CBC AUTOMATED: CPT

## 2023-11-02 PROCEDURE — 36415 COLL VENOUS BLD VENIPUNCTURE: CPT

## 2023-11-02 PROCEDURE — 6360000002 HC RX W HCPCS: Performed by: NURSE PRACTITIONER

## 2023-11-02 PROCEDURE — 99233 SBSQ HOSP IP/OBS HIGH 50: CPT | Performed by: NURSE PRACTITIONER

## 2023-11-02 PROCEDURE — 82248 BILIRUBIN DIRECT: CPT

## 2023-11-02 PROCEDURE — 85730 THROMBOPLASTIN TIME PARTIAL: CPT

## 2023-11-02 PROCEDURE — A9537 TC99M MEBROFENIN: HCPCS | Performed by: NURSE PRACTITIONER

## 2023-11-02 PROCEDURE — 80053 COMPREHEN METABOLIC PANEL: CPT

## 2023-11-02 PROCEDURE — 2580000003 HC RX 258: Performed by: NURSE PRACTITIONER

## 2023-11-02 RX ORDER — LISINOPRIL 5 MG/1
5 TABLET ORAL DAILY
Status: DISCONTINUED | OUTPATIENT
Start: 2023-11-02 | End: 2023-11-03

## 2023-11-02 RX ORDER — CARVEDILOL 6.25 MG/1
6.25 TABLET ORAL 2 TIMES DAILY WITH MEALS
Status: DISCONTINUED | OUTPATIENT
Start: 2023-11-02 | End: 2023-11-03 | Stop reason: HOSPADM

## 2023-11-02 RX ADMIN — Medication 8.5 MILLICURIE: at 07:45

## 2023-11-02 RX ADMIN — CYCLOBENZAPRINE 10 MG: 10 TABLET, FILM COATED ORAL at 14:50

## 2023-11-02 RX ADMIN — HYDROCHLOROTHIAZIDE 12.5 MG: 25 TABLET ORAL at 10:30

## 2023-11-02 RX ADMIN — CYCLOBENZAPRINE 10 MG: 10 TABLET, FILM COATED ORAL at 20:40

## 2023-11-02 RX ADMIN — DOCUSATE SODIUM 100 MG: 100 CAPSULE, LIQUID FILLED ORAL at 20:40

## 2023-11-02 RX ADMIN — SENNOSIDES 17.2 MG: 8.6 TABLET, FILM COATED ORAL at 20:41

## 2023-11-02 RX ADMIN — PIPERACILLIN AND TAZOBACTAM 3375 MG: 3; .375 INJECTION, POWDER, LYOPHILIZED, FOR SOLUTION INTRAVENOUS at 17:12

## 2023-11-02 RX ADMIN — ACETAMINOPHEN 650 MG: 325 TABLET ORAL at 10:17

## 2023-11-02 RX ADMIN — DOCUSATE SODIUM 100 MG: 100 CAPSULE, LIQUID FILLED ORAL at 10:19

## 2023-11-02 RX ADMIN — PIPERACILLIN AND TAZOBACTAM 3375 MG: 3; .375 INJECTION, POWDER, LYOPHILIZED, FOR SOLUTION INTRAVENOUS at 10:06

## 2023-11-02 RX ADMIN — GABAPENTIN 600 MG: 300 CAPSULE ORAL at 20:40

## 2023-11-02 RX ADMIN — GABAPENTIN 600 MG: 300 CAPSULE ORAL at 14:49

## 2023-11-02 RX ADMIN — PIPERACILLIN AND TAZOBACTAM 3375 MG: 3; .375 INJECTION, POWDER, LYOPHILIZED, FOR SOLUTION INTRAVENOUS at 00:47

## 2023-11-02 RX ADMIN — POLYETHYLENE GLYCOL (3350) 17 G: 17 POWDER, FOR SOLUTION ORAL at 10:19

## 2023-11-02 RX ADMIN — CARVEDILOL 6.25 MG: 6.25 TABLET, FILM COATED ORAL at 17:09

## 2023-11-02 RX ADMIN — ACETAMINOPHEN 650 MG: 325 TABLET ORAL at 23:38

## 2023-11-02 RX ADMIN — GABAPENTIN 600 MG: 300 CAPSULE ORAL at 10:26

## 2023-11-02 RX ADMIN — RIVAROXABAN 10 MG: 10 TABLET, FILM COATED ORAL at 17:09

## 2023-11-02 RX ADMIN — LISINOPRIL 5 MG: 5 TABLET ORAL at 14:51

## 2023-11-02 RX ADMIN — ACETAMINOPHEN 650 MG: 325 TABLET ORAL at 17:08

## 2023-11-02 RX ADMIN — SODIUM CHLORIDE 1.49 MCG: 9 INJECTION, SOLUTION INTRAVENOUS at 08:50

## 2023-11-02 RX ADMIN — CYCLOBENZAPRINE 10 MG: 10 TABLET, FILM COATED ORAL at 10:16

## 2023-11-02 RX ADMIN — ACETAMINOPHEN 650 MG: 325 TABLET ORAL at 00:45

## 2023-11-02 ASSESSMENT — ENCOUNTER SYMPTOMS
COLOR CHANGE: 0
SORE THROAT: 0
ABDOMINAL PAIN: 0
EYE REDNESS: 0
SHORTNESS OF BREATH: 0
CONSTIPATION: 0
COUGH: 0
WHEEZING: 0
NAUSEA: 0
VOMITING: 0
DIARRHEA: 0
ABDOMINAL DISTENTION: 0
CONSTIPATION: 1
BACK PAIN: 0
CHEST TIGHTNESS: 0

## 2023-11-02 ASSESSMENT — PAIN DESCRIPTION - DESCRIPTORS
DESCRIPTORS: ACHING
DESCRIPTORS: CRAMPING

## 2023-11-02 ASSESSMENT — PAIN DESCRIPTION - FREQUENCY
FREQUENCY: INTERMITTENT
FREQUENCY: INTERMITTENT

## 2023-11-02 ASSESSMENT — PAIN DESCRIPTION - ORIENTATION
ORIENTATION: RIGHT
ORIENTATION: LEFT;LOWER

## 2023-11-02 ASSESSMENT — PAIN DESCRIPTION - LOCATION
LOCATION: LEG
LOCATION: HIP;LEG

## 2023-11-02 ASSESSMENT — PAIN DESCRIPTION - PAIN TYPE: TYPE: SURGICAL PAIN

## 2023-11-02 ASSESSMENT — PAIN SCALES - GENERAL
PAINLEVEL_OUTOF10: 2

## 2023-11-02 ASSESSMENT — PAIN - FUNCTIONAL ASSESSMENT: PAIN_FUNCTIONAL_ASSESSMENT: PREVENTS OR INTERFERES SOME ACTIVE ACTIVITIES AND ADLS

## 2023-11-02 NOTE — PROGRESS NOTES
Patient arrived from 2A. Vital signs obtained, repositioned for comfort. Oriented to unit and room. Call light within reach. Bed in lowest locked position, with alarm active. Voiced no further needs at this time.

## 2023-11-02 NOTE — PROGRESS NOTES
[facility]  PHYSICAL THERAPY MISSED TREATMENT NOTE  [facility] NEUROSCIENCES 4A    Date: 2023  Patient Name: Ligia Shi        MRN: [id number]   : 1960  (61 y.o.)  Gender: female   Referring Practitioner: Kirkland Boast, APRN - CNP  Diagnosis: Lumbar compression fracture, closed, initial encounter         REASON FOR MISSED TREATMENT:  Pt off unit for HIDA scan- will check back later if able or next available date .

## 2023-11-02 NOTE — PROGRESS NOTES
House  Home Layout: Two level, Able to Live on Main level with bedroom/bathroom, Performs ADL's on one level  Home Equipment: Bo Form, standard, Wheelchair-manual (has wheels for walker)   Bathroom Shower/Tub: Tub/Shower unit  Bathroom Toilet: Standard  Bathroom Equipment:  (has been sponge bathing)    ADL Assistance: [address] Independent  Homemaking Responsibilities: Yes  Ambulation Assistance: Independent  Transfer Assistance: Independent  Active : Yes  Additional Comments: Typically indep withunctional mobility using cane or cart while at work. Does not use AD when in the house. Typically indep with ADL and IADLs. Restrictions/Precautions:  Restrictions/Precautions: Fall Risk, General Precautions  Required Braces or Orthoses  Spinal: Lumbar Corset  Spinal Other: hard LSO to be worn with all activity and when out of bed  Position Activity Restriction  Spinal Precautions: No Bending, No Lifting, No Twisting  Hip Precautions: Anterior hip precautions  Other position/activity restrictions: non-surgical as of date of eval-monitor upcoming imaging for stability of lumbar region. R PITO 10/19 by  Dr Agustina Smith.  Left sided sciatica like pain       SUBJECTIVE: nursing ok'd therapy, pt in bed and agreeable for therapy, pt anxious and demonstrated increased pain w/ mobility - we attempted getting up 3 times during session     PAIN: min pain at rest however pain increased to 5-10/10 once sitting edge of bed w/ c/o of pain down left LE she would lay back down and pain would decrease - did take extended time to get pt comfortable at end of session up in chair - nursing aware of pts pain responses to activity- educated pt and encouraged deep breathing     Vitals: Orthostatic Blood Pressure: Supine: 133/77, Sittin/81, Standin/94( unable to get a full standing reading) pt kept closing her eyes c/o of dizziness   Oxygen: > 92% on room air   Heart Rate: 70-80's    OBJECTIVE:  Bed Mobility:  Rolling to Right: Minimal Assistance, to CGA with rail and extra time     Supine to Sit: Moderate Assistance, to max assist x 3 trials during session w/ extra time   Sit to Supine: Moderate Assistance, miultiple times during session    Scooting: Moderate Assistance, to min assist as pt leaning post w/ sitting edge of bed     Transfers:  Sit to Stand: Moderate Assistance, to min assist   Stand to [address]- once back in chair pt needed assist to scoot hips back in chair     Ambulation:  Minimal Assistance  Distance: 3 feet to chair   Surface: Level Tile  Device:Rolling Walker  BALANCE:   Sitting edge of bed pt needed min to [place] she demonstrated a post lean and had diffiuclty bringing shoulders forward- pt was dependent to nando TLSO while sitting   Standing at walker with CGA to min assist - stood 2 min and pt wanting to take steps to the chair   Exercise:  Patient was guided in 1 set(s) 10 reps of exercise to both lower extremities. Ankle pumps, Glut sets, Quad sets, and Heelslides. Exercises were completed for increased independence with functional mobility. Functional Outcome Measures: Completed  -PAC Inpatient Mobility without Stair Climbing Raw Score : 12  -PAC Inpatient without Stair Climbing T-Scale Score : 37.26  Modified [place] Scale:  Not Tested    ASSESSMENT:  Assessment:  Pt demonstrated increased left LE pain w/ mobility this date and c/o of dizziness w/ mobility. Took extended time to complete tasks and only able to walk to chair. Pt would benefit from cont skilled therapy   Activity Tolerance:  Patient tolerance of  treatment: fair.  Limited by pain        Equipment Recommendations:Equipment Needed: No  Discharge Recommendations: Continue to assess pending progress  Plan: Current Treatment Recommendations: Strengthening, Therapeutic activities, Safety education & training, Patient/Caregiver education & training, Endurance training, Equipment evaluation, education, & procurement, Home exercise program, Balance training, Functional mobility training, Transfer training, Gait training, Stair training, Pain management, Neuromuscular re-education  General Plan:  (6x O)    Patient Education  Patient Education: Plan of Care    Goals:  Patient Goals : \"be able to move\"  Short Term Goals  Time Frame for Short Term Goals: by discharge  Short Term Goal 1: Pt will demo rolling with SBA with bed flat to return home safely. Short Term Goal 2: Pt to transfer supine <--> sit SBA to enable pt to get in/out of bed. Short Term Goal 3: Pt to transfer sit <--> stand SBA for increased functional mobility. Short Term Goal 4: Pt to ambulate >25 feet with RW SBA for room ambulation. Long Term Goals  Time Frame for Long Term Goals : NA due to short ELOS    Following session, patient left in safe position with all fall risk precautions in place.

## 2023-11-02 NOTE — FLOWSHEET NOTE
11/02/23 6594   Safe Environment   Safety Measures Other (comment)  (Virtual Nurse Safety Round)     VN called into patients room and introduced myself and role. Staff in room with patient. Pharmacy calls and states that patIients Mounjaro is NOT covered by his insurance.

## 2023-11-02 NOTE — CONSULTS
Patient known to our service from earlier this admission. Please see today's progress note.        Electronically signed by AUBREY Andre CNP on 11/2/2023 at 12:13 PM

## 2023-11-02 NOTE — CARE COORDINATION
11/2/23, 3:23 PM EDT    DISCHARGE PLANNING EVALUATION    Met with Celestina Ugalde. She is aware she has been accepted to [facility] and the insurance precert has been started. She told SW that [facility] would be easier for people to visit. If she would stay here for [facility] it would be harder for her spouse to come and visit.

## 2023-11-02 NOTE — PROGRESS NOTES
Hospitalist Progress Note    Patient:  Lucas Angeles      Unit/Bed:4A-07/007-A    YOB: 1960    MRN: [id number]       Acct: [de-identified]     PCP: Tenisha Workman MD    Date of Admission: 10/24/2023    Assessment/Plan:    UTI: UA positive with urine culture growing E. coli and Pseudomonas aeruginosa. Started on Zosyn x 7 10/30. Will transition to Cipro on discharge. Intracardiac shunt: Moderate PFO/ASD noted on TTE and then confirmed on DANNY 10/31. Initially evaluated due to persistent hypoxia with unknown cause. BLE US negative for DVT. However O2 weaned off, see below. Further recommendations per structural heart regarding PFO/ASD closure timeline. Acute HFmrEF with diastolic dysfunction: Unclear etiology. Not in exacerbation. Last echo EF 45% 10/23. No obvious signs of volume overload. proBNP WNL. HST downtrending and no ischemic changes on EKG, unlikely ACS. Will require ischemic evaluation outpatient, establishing care with cardiology and follow-up with CHF clinic. Started on low-dose BB and ACEi. Cholelithiasis with gallbladder wall distention: Noted on CTA/P with elevated alk phos. Negative Wilson sign. Elevated GGT. RUQ US positive for mild gallbladder and CBD distention. Underwent HIDA scan 11/2/2023 that demonstrated patent cystic and CBD without evidence of acute cholecystitis, normal GB EF. Discontinue heparin GTT, restart home Xarelto. L5 pars defect/stress fracture: Orthopedic surgery managing. LSO brace. PT/OT. Pain control. Remote PITO, right-sided: Surgery on 10/19. Orthopedic surgery following. PT/OT. On Xarelto postop for DVT prophylaxis. HTN: No prior medications. Will start on BB and ACEi with holding parameters given new diagnosis of heart failure. Acute hypoxic respiratory failure, resolved: Unclear etiology, possibly secondary to pain and limited mobility postop. Presented hypoxic, requiring escalation from 4 L NC to HFNC.   CXR negative for software. It may contain minor errors which are inherent in voice recognition technology. **      Final report electronically signed by Dr. Fer Hdez on 10/29/2023 12:27 PM      CTA CHEST [facility]   Final Result   1. No acute pulmonary embolus. 2. Ascending aortic aneurysm 4.4 cm diameter. Follow-up as needed. 3. Acute nondisplaced fracture anterior inferior T11 vertebral body. This document has been electronically signed by: Calderon Paula MD on 10/29/2023 04:56 AM      All CTs at this facility use dose modulation techniques and iterative    reconstructions, and/or weight-based dosing   when appropriate to reduce radiation to a low as reasonably achievable. 3D Post-processing was performed on this study. XR CHEST PORTABLE   Final Result   1. No acute findings. This document has been electronically signed by: Pau Berry MD on    10/29/2023 03:32 AM      XR CHEST PORTABLE   Final Result   Stable radiographic appearance of the chest. No evidence of an acute process. **This report has been created using voice recognition software. It may contain minor errors which are inherent in voice recognition technology. **      Final report electronically signed by Dr. Fer Hdez on 10/28/2023 8:31 AM      XR LUMBAR SPINE (2-3 VIEWS)   Final Result   1. Bones are diffusely demineralized, consistent with osteoporosis. 2. Mild thoracolumbar levoscoliosis. Cannot exclude muscle spasm right side. 3. No fracture seen. Disc spaces well-maintained. Question degenerative changes both sacroiliac joints which are not well demonstrated on this study. **This report has been created using voice recognition software. It may contain minor errors which are inherent in voice recognition technology. **      Final report electronically signed by Dr. Annabel Jackman on 10/27/2023 3:15 PM          DVT prophylaxis: [] Lovenox                                 [] SCDs

## 2023-11-02 NOTE — CARE COORDINATION
11/2/23, 8:42 AM EDT    DISCHARGE PLANNING EVALUATION     SW spoke with Caron Reardon at [facility], they do not accept pts insurance, not in network. ELVER spoke with , states [facility] would be his next option. ELVER spoke with Gina at [facility], she will have staff review this am. This will be a PRECERT. Pc received from Gina at [facility], they can accept pt and will start precert when pt is medically ready. ELVER updated pts .

## 2023-11-02 NOTE — PROGRESS NOTES
[address] [place] [address]  Occupational Therapy  Daily Note  Time:   Time In: 6541  Time Out: 1400  Timed Code Treatment Minutes: 25 Minutes  Minutes: 25          Date: 2023  Patient Name: Susan Dyer,   Gender: female      Room: [facility]  MRN: [id number]  : 1960  (61 y.o.)  Referring Practitioner: AUBREY Wilkes CNP  Diagnosis: lumbar compression fracture, closed initial encounter  Additional Pertinent Hx: 61 y.o. female who presents as a transfer from [facility] for further evaluation of severe hip and left leg radicular pain, lumbar pain. Underwent R DA PITO with Dr Fly Carlos 10/19/23, noted significant low back and hip pain which increased with activity in post operative therapy course. She was discharged and later returned to the ED due to lack of pain control and inability to ambulate. Imaging revealed changes at C3, L5-S1, transfer was initiated for further evaluation by spine team. MRI of the lumbar spine completed on the date of 10/23/2023 at [facility] again shows anterolisthesis of L5-S1, left foraminal  stenosis due to encroachment by disk bulge and facet arthropathy and  widening of the disk space at L5-S1 as well. No surgical intervention warranted at this immediate time. LSO to be worn when up. Restrictions/Precautions:  Restrictions/Precautions: Fall Risk, General Precautions  Required Braces or Orthoses  Spinal: Lumbar Corset  Spinal Other: hard LSO to be worn with all activity and when out of bed  Position Activity Restriction  Spinal Precautions: No Bending, No Lifting, No Twisting  Hip Precautions: Anterior hip precautions  Other position/activity restrictions: non-surgical as of date of eval-monitor upcoming imaging for stability of lumbar region. R PITO 10/19 by  Dr Fly Carlos. Left sided sciatica like pain     SUBJECTIVE: Patient supine in bed upon arrival; agreeable to therapy this date.   Patient pleasant and cooperative

## 2023-11-02 NOTE — CARE COORDINATION
11/2/23, 1:04 PM EDT    DISCHARGE ON GOING [address] day: 9  Location: 4A-07/007-A Reason for admit: Lumbar compression fracture, closed, initial encounter ([facility]) [L78.728T]     Procedure:   10/27 L/S xray with brace on: Bones are diffusely demineralized, consistent with osteoporosis. Mild thoracolumbar levoscoliosis. Cannot exclude muscle spasm right side. No fracture seen. Disc spaces well-maintained. Question degenerative changes both sacroiliac joints which are not well demonstrated on this study  10/29 CTA Chest: No acute pulmonary embolus; Ascending aortic aneurysm 4.4 cm diameter. Follow-up as needed; Acute nondisplaced fracture anterior inferior T11 vertebral body  10/30 LLE Venous doppler: Neg for DVT  10/31 DANNY:Normal left ventricular size and systolic function. There were no regional wall motion abnormalities. Wall thickness was within normal limits. Ejection fraction was estimated at 55-60%. Left atrial size was moderately dilated with no thrombus identified. ATRIAL SEPTUM: Large R to L shunt seen without provocation suggestive of large PFO. Atrial septal aneurysm seen    Barriers to Discharge: Hospistalist and ortho surgery on, Dr Silvestre Salas informally following with recs. PT/OT. TLSO brace when up, WBAT per ortho. + urine cultures. Telemetry. External urinary catheter. Heparin gtt. IV Zosyn. Dr Fred Ramos, IM resident, states that Dr Silvestre Salas is ok with OP f/u for PFO if no increased o2 needs. Pt is on RA. PCP: Sesar Montiel MD  Readmission Risk Score: 10.4%    Patient Goals/Plan/Treatment Preferences: SW starting precert for [facility]. See note.

## 2023-11-02 NOTE — PLAN OF CARE
Problem: Discharge Planning  Goal: Discharge to home or other facility with appropriate resources  Outcome: Progressing  Discharge to home or other facility with appropriate resources:   Identify discharge learning needs (meds, wound care, etc)   Refer to discharge planning if patient needs post-hospital services based on physician order or complex needs related to functional status, cognitive ability or social support system   Identify barriers to discharge with patient and caregiver   Arrange for needed discharge resources and transportation as appropriate     Problem: Skin/Tissue Integrity  Goal: Absence of new skin breakdown  Description: 1. Monitor for areas of redness and/or skin breakdown  2. Assess vascular access sites hourly  3. Every 4-6 hours minimum:  Change oxygen saturation probe site  4. Every 4-6 hours:  If on nasal continuous positive airway pressure, respiratory therapy assess nares and determine need for appliance change or resting period. Outcome: Progressing  Note: No signs of skin breakdown. Skin warm, dry, and intact. Mucous membranes pink and moist.  Assistance with turns/ambulation provided PRN. Will continue to monitor.        Problem: Safety - Adult  Goal: Free from fall injury  Outcome: Progressing  Free From Fall Injury:   Instruct family/caregiver on patient safety   Based on caregiver fall risk screen, instruct family/caregiver to ask for assistance with transferring infant if caregiver noted to have fall risk factors     Problem: Pain  Goal: Verbalizes/displays adequate comfort level or baseline comfort level  Outcome: Progressing  Verbalizes/displays adequate comfort level or baseline comfort level:   Administer analgesics based on type and severity of pain and evaluate response   Encourage patient to monitor pain and request assistance   Assess pain using appropriate pain scale   Implement non-pharmacological measures as appropriate and evaluate response   Notify Licensed

## 2023-11-03 ENCOUNTER — TELEPHONE (OUTPATIENT)
Dept: CARDIOLOGY CLINIC | Age: 63
End: 2023-11-03

## 2023-11-03 VITALS
TEMPERATURE: 97.7 F | WEIGHT: 164.24 LBS | HEART RATE: 90 BPM | SYSTOLIC BLOOD PRESSURE: 102 MMHG | BODY MASS INDEX: 31.01 KG/M2 | DIASTOLIC BLOOD PRESSURE: 67 MMHG | HEIGHT: 61 IN | RESPIRATION RATE: 18 BRPM | OXYGEN SATURATION: 99 %

## 2023-11-03 DIAGNOSIS — I50.22 HEART FAILURE WITH MID-RANGE EJECTION FRACTION (HFMEF) (HCC): Primary | ICD-10-CM

## 2023-11-03 LAB
ANION GAP SERPL CALC-SCNC: 11 MEQ/L (ref 8–16)
APTT PPP: 38 SECONDS (ref 22–38)
BUN SERPL-MCNC: 10 MG/DL (ref 7–22)
CALCIUM SERPL-MCNC: 9.1 MG/DL (ref 8.5–10.5)
CHLORIDE SERPL-SCNC: 98 MEQ/L (ref 98–111)
CO2 SERPL-SCNC: 24 MEQ/L (ref 23–33)
CREAT SERPL-MCNC: 0.8 MG/DL (ref 0.4–1.2)
DEPRECATED RDW RBC AUTO: 51.1 FL (ref 35–45)
ERYTHROCYTE [DISTWIDTH] IN BLOOD BY AUTOMATED COUNT: 14.6 % (ref 11.5–14.5)
GFR SERPL CREATININE-BSD FRML MDRD: > 60 ML/MIN/1.73M2
GLUCOSE SERPL-MCNC: 88 MG/DL (ref 70–108)
HCT VFR BLD AUTO: 33.5 % (ref 37–47)
HGB BLD-MCNC: 10.8 GM/DL (ref 12–16)
MCH RBC QN AUTO: 31.1 PG (ref 26–33)
MCHC RBC AUTO-ENTMCNC: 32.2 GM/DL (ref 32.2–35.5)
MCV RBC AUTO: 96.5 FL (ref 81–99)
PLATELET # BLD AUTO: 319 THOU/MM3 (ref 130–400)
PMV BLD AUTO: 10.8 FL (ref 9.4–12.4)
POTASSIUM SERPL-SCNC: 3.4 MEQ/L (ref 3.5–5.2)
RBC # BLD AUTO: 3.47 MILL/MM3 (ref 4.2–5.4)
SODIUM SERPL-SCNC: 133 MEQ/L (ref 135–145)
WBC # BLD AUTO: 7.3 THOU/MM3 (ref 4.8–10.8)

## 2023-11-03 PROCEDURE — 36415 COLL VENOUS BLD VENIPUNCTURE: CPT

## 2023-11-03 PROCEDURE — 6370000000 HC RX 637 (ALT 250 FOR IP): Performed by: PHYSICIAN ASSISTANT

## 2023-11-03 PROCEDURE — 6370000000 HC RX 637 (ALT 250 FOR IP): Performed by: NURSE PRACTITIONER

## 2023-11-03 PROCEDURE — 85730 THROMBOPLASTIN TIME PARTIAL: CPT

## 2023-11-03 PROCEDURE — 2580000003 HC RX 258: Performed by: NURSE PRACTITIONER

## 2023-11-03 PROCEDURE — 6370000000 HC RX 637 (ALT 250 FOR IP): Performed by: STUDENT IN AN ORGANIZED HEALTH CARE EDUCATION/TRAINING PROGRAM

## 2023-11-03 PROCEDURE — 85027 COMPLETE CBC AUTOMATED: CPT

## 2023-11-03 PROCEDURE — 86812 HLA TYPING A B OR C: CPT

## 2023-11-03 PROCEDURE — 97530 THERAPEUTIC ACTIVITIES: CPT

## 2023-11-03 PROCEDURE — 97110 THERAPEUTIC EXERCISES: CPT

## 2023-11-03 PROCEDURE — 80048 BASIC METABOLIC PNL TOTAL CA: CPT

## 2023-11-03 PROCEDURE — 2580000003 HC RX 258: Performed by: STUDENT IN AN ORGANIZED HEALTH CARE EDUCATION/TRAINING PROGRAM

## 2023-11-03 PROCEDURE — 99239 HOSP IP/OBS DSCHRG MGMT >30: CPT | Performed by: STUDENT IN AN ORGANIZED HEALTH CARE EDUCATION/TRAINING PROGRAM

## 2023-11-03 PROCEDURE — 6360000002 HC RX W HCPCS: Performed by: NURSE PRACTITIONER

## 2023-11-03 RX ORDER — CIPROFLOXACIN 500 MG/1
500 TABLET, FILM COATED ORAL 2 TIMES DAILY
Qty: 4 TABLET | Refills: 0 | Status: SHIPPED | OUTPATIENT
Start: 2023-11-03 | End: 2023-11-05

## 2023-11-03 RX ORDER — GABAPENTIN 300 MG/1
600 CAPSULE ORAL 3 TIMES DAILY
Qty: 180 CAPSULE | Refills: 0 | Status: SHIPPED | OUTPATIENT
Start: 2023-11-03 | End: 2023-12-03

## 2023-11-03 RX ORDER — 0.9 % SODIUM CHLORIDE 0.9 %
500 INTRAVENOUS SOLUTION INTRAVENOUS ONCE
Status: COMPLETED | OUTPATIENT
Start: 2023-11-03 | End: 2023-11-03

## 2023-11-03 RX ADMIN — PIPERACILLIN AND TAZOBACTAM 3375 MG: 3; .375 INJECTION, POWDER, LYOPHILIZED, FOR SOLUTION INTRAVENOUS at 00:05

## 2023-11-03 RX ADMIN — CYCLOBENZAPRINE 10 MG: 10 TABLET, FILM COATED ORAL at 08:35

## 2023-11-03 RX ADMIN — PIPERACILLIN AND TAZOBACTAM 3375 MG: 3; .375 INJECTION, POWDER, LYOPHILIZED, FOR SOLUTION INTRAVENOUS at 08:46

## 2023-11-03 RX ADMIN — SODIUM CHLORIDE 500 ML: 9 INJECTION, SOLUTION INTRAVENOUS at 08:46

## 2023-11-03 RX ADMIN — DOCUSATE SODIUM 100 MG: 100 CAPSULE, LIQUID FILLED ORAL at 08:35

## 2023-11-03 RX ADMIN — PANTOPRAZOLE SODIUM 40 MG: 40 TABLET, DELAYED RELEASE ORAL at 05:06

## 2023-11-03 RX ADMIN — ACETAMINOPHEN 650 MG: 325 TABLET ORAL at 05:06

## 2023-11-03 RX ADMIN — NALOXEGOL OXALATE 12.5 MG: 12.5 TABLET, FILM COATED ORAL at 05:06

## 2023-11-03 RX ADMIN — POLYETHYLENE GLYCOL (3350) 17 G: 17 POWDER, FOR SOLUTION ORAL at 08:41

## 2023-11-03 RX ADMIN — GABAPENTIN 600 MG: 300 CAPSULE ORAL at 08:35

## 2023-11-03 ASSESSMENT — PAIN DESCRIPTION - ORIENTATION: ORIENTATION: LEFT

## 2023-11-03 ASSESSMENT — PAIN SCALES - GENERAL
PAINLEVEL_OUTOF10: 0
PAINLEVEL_OUTOF10: 0

## 2023-11-03 ASSESSMENT — PAIN DESCRIPTION - DESCRIPTORS: DESCRIPTORS: ACHING

## 2023-11-03 ASSESSMENT — PAIN DESCRIPTION - LOCATION: LOCATION: BACK;HIP

## 2023-11-03 NOTE — PLAN OF CARE
Problem: Discharge Planning  Goal: Discharge to home or other facility with appropriate resources  Outcome: Progressing     Problem: Skin/Tissue Integrity  Goal: Absence of new skin breakdown  Description: 1. Monitor for areas of redness and/or skin breakdown  2. Assess vascular access sites hourly  3. Every 4-6 hours minimum:  Change oxygen saturation probe site  4. Every 4-6 hours:  If on nasal continuous positive airway pressure, respiratory therapy assess nares and determine need for appliance change or resting period. Outcome: Progressing     Problem: Safety - Adult  Goal: Free from fall injury  Outcome: Progressing   All fall precautions in place. Bed in low position, alarm activated and appropriate use of call light. Problem: Pain  Goal: Verbalizes/displays adequate comfort level or baseline comfort level  Outcome: Progressing   Pain Assessment: None - Denies Pain  Pain Level: 2   Patient's Stated Pain Goal: 0 - No pain   Is pain goal met at this time? Yes     Non-Pharmaceutical Pain Intervention(s): Ice    Problem: Respiratory - Adult  Goal: Achieves optimal ventilation and oxygenation  Outcome: Progressing     Problem: Respiratory - Adult  Goal: Clear lung sounds  Outcome: Progressing     Problem: Skin/Tissue Integrity - Adult  Goal: Skin integrity remains intact  Outcome: Progressing   Skin assessment completed. Patient turned every 2 hours and as needed. No skin breakdown this shift. Problem: Musculoskeletal - Adult  Goal: Return mobility to safest level of function  Outcome: Progressing     Problem: Genitourinary - Adult  Goal: Urinary catheter remains patent  Outcome: Progressing     Problem: ABCDS Injury Assessment  Goal: Absence of physical injury  Outcome: Progressing       Care plan reviewed with patient. Patient verbalize understanding of the plan of care and contribute to goal setting.

## 2023-11-03 NOTE — TELEPHONE ENCOUNTER
5k RN called to make a 2 day f/u with Dr Luisa Ling. Appt scheduled for next week. Checked with Natalie-Juliana RN to see if this appointment is needed this soon. Tiffany Caro checked with Dr Luisa Ling. Per Dr Luisa Ling- he does not need to see patient. She just needs to schedule with CHF clinic and with NP/PA in 4 weeks. Appt with Crystal scheduled for 12/4/23 @ 1400. Appt for next week cancelled. Called back to floor. Pt has been discharged to [facility]. [phone]  CHF referral has been placed. CHF would you like to schedule patient, then [facility]?

## 2023-11-03 NOTE — PROGRESS NOTES
[facility]  OCCUPATIONAL THERAPY MISSED TREATMENT NOTE  [facility] 5K  5K-15/015-A      Date: 11/3/2023  Patient Name: Faizan Talavera        CSN: [id number]   : 1960  (61 y.o.)  Gender: female   Referring Practitioner: AUBREY Douglass CNP  Diagnosis: lumbar compression fracture, closed initial encounter         REASON FOR MISSED TREATMENT: Hold Treatment per Nursing Patient having low BP at this time. Will attempt back as time allows.

## 2023-11-03 NOTE — CARE COORDINATION
11/3/23, 8:21 AM EDT    DISCHARGE PLANNING EVALUATION     Call from [facility] with [address] is approved. ELVER message provider. 8:44 AM EDT  Provider anticipating discharge today. ELVER updated pt on insurance approved, discussed transport, planning to ambulette, pt on O2 when SW visiting this morning. Call to [facility], transport set for 1:30pm. ELVER faxed transport paperwork to [facility], copy placed on chart. Update nurse on transport time. Call to [facility] with [facility], updated on transport time. 11/3/23, 1:10 PM EDT    Patient goals/plan/ treatment preferences discussed by  and . Patient goals/plan/ treatment preferences reviewed with patient/ family. Patient/ family verbalize understanding of discharge plan and are in agreement with goal/plan/treatment preferences. Understanding was demonstrated using the teach back method. AVS provided by RN at time of discharge, which includes all necessary medical information pertaining to the patients current course of illness, treatment, post-discharge goals of care, and treatment preferences. Services At/After Discharge: [facility] (SNF) and In [facility], [address] [place] [address] Ambulance    Pt's  visiting this morning, ELVER updated on plans to dischareg today and transport time.          ELVER faxed AVS and MAR to Sheyla Cooper

## 2023-11-03 NOTE — CARE COORDINATION
11/3/23, 7:25 AM EDT    DISCHARGE BARRIERS        Patient transferred to [facility]. Report given to unit Radha Harmon, regarding discharge plan for this patient.

## 2023-11-03 NOTE — PROGRESS NOTES
Report called to nurse at [facility] in [facility]. Meds to bed delivered. Patient's  present at bedside. Patient transported to facility via wheelchair. All personal belonging sent. No questions at this time.

## 2023-11-03 NOTE — PROGRESS NOTES
[facility]  INPATIENT PHYSICAL THERAPY  [facility] ONC MED 5K - 5K-15/015-A  Daily Note    Time In: 2572  Time Out: 8728  Timed Code Treatment Minutes: 23 Minutes  Minutes: 23          Date: 11/3/2023  Patient Name: Wayland Kehr,  Gender:  female        MRN: [id number]  : 1960  (61 y.o.)     Referring Practitioner: AUBREY Goff CNP  Diagnosis: Lumbar compression fracture, closed, initial encounter  Additional Pertinent Hx: Per EMr \"61 y.o. female who presents as a transfer from [facility] for further evaluation of severe hip and left leg radicular pain, lumbar pain. Underwent R DA PITO with Dr Gatito Montana 10/19/23, noted significant low back and hip pain which increased with activity in post operative therapy course. She was discharged and later returned to the ED due to lack of pain control and inability to ambulate. Imaging revealed changes at C3, L5-S1, transfer was initiated for further evaluation by spine team. Her right hip pain is well controlled at this time, paresthesias predominately at the LLE and L low back. No bowel or bladder disturbances. Does admit to some weakness overall post operatively. \" Imaging showed Acute nondisplaced fracture anterior inferior T11 vertebral body. MRI was completed of the lumbar spine which did show extension type soft tissue injury with a L5 pars defect the L5-S1 level. Ortho treating nonsurgically at this time. LSO to be worn out of bed. Overnight 10/29/23 patient oxygen saturation worsened and she required HFNC 60 lpm, 100% FiO2, CTA chest showed no PE. Hospitalist called and asked for the patient to be transferred to ICU for treatment and evaluation of hypoxia; Patient transferred down to ICU, when I saw her the patient is not in distress on BiPAP. B Doppler 10/30 negative for DVT. Pt with UTI, CT 10/30 shows colitis and gall bladder distention, to have US.      Prior Level of Function:  Lives With: Spouse  Type of Home: [address]

## 2023-11-03 NOTE — DISCHARGE SUMMARY
Hospital Medicine Discharge Summary      Patient Identification:   Lynne Perez   : 1960  MRN: [id number]   Account: [de-identified]      Patient's PCP: Monika Ge MD    Admit Date: 10/24/2023     Discharge Date:   11/3/2023    Admitting Physician: Mendez Barfield MD     Discharge Physician: Nora Maki,      Discharge Diagnoses:  UTI: UA positive with urine culture growing E. coli and Pseudomonas aeruginosa. Started on Zosyn x 7 10/30. Will transition to Cipro x2 to complete antibiotic course. .  Intracardiac shunt: Moderate PFO/ASD noted on TTE and then confirmed on DANNY 10/31. Initially evaluated due to persistent hypoxia with unknown cause. BLE US negative for DVT. However O2 weaned off, see below. Per cardiology no emergent need for PFO/ASD closure as weaned to RA. Acute HFmrEF with diastolic dysfunction: Unclear etiology. Not in exacerbation. Last echo EF 45% 10/23. No obvious signs of volume overload. proBNP WNL. HST downtrending and no ischemic changes on EKG, unlikely ACS. Will require ischemic evaluation outpatient, establishing care with cardiology and follow-up with CHF clinic. Trialed low-dose BB and ACEi, but patient hypotensive. This discontinued. Advised outpatient follow-up for closer medication initiation. Cholelithiasis with gallbladder wall distention: Noted on CTA/P with elevated alk phos. Negative Wilson sign. Elevated GGT. RUQ US positive for mild gallbladder and CBD distention. Underwent HIDA scan 2023 that demonstrated patent cystic and CBD without evidence of acute cholecystitis, normal GB EF. Discontinue heparin GTT, restart home Xarelto for surgery prophylaxis. L5 pars defect/stress fracture: Orthopedic surgery managing. LSO brace. PT/OT. Pain control. Remote PITO, right-sided: Surgery on 10/19. Orthopedic surgery following. PT/OT. On Xarelto postop for DVT prophylaxis. HTN: Takes hyzar at home.  Started on BB and ACEi but sensitive, so color, texture, turgor normal.  No rashes or lesions. Neurologic:  Neurovascularly intact without any focal sensory/motor deficits. Cranial nerves: II-XII intact, grossly non-focal.  Psychiatric:  Alert and oriented, thought content appropriate, normal insight  Capillary Refill: Brisk,< 3 seconds   Peripheral Pulses: +2 palpable, equal bilaterally       Labs: For convenience and continuity at follow-up the following most recent labs are provided:      CBC:    Lab Results   Component Value Date/Time    WBC 7.3 11/03/2023 04:09 AM    HGB 10.8 11/03/2023 04:09 AM    HCT 33.5 11/03/2023 04:09 AM     11/03/2023 04:09 AM       Renal:    Lab Results   Component Value Date/Time     11/03/2023 04:09 AM    K 3.4 11/03/2023 04:09 AM    K 4.3 04/08/2018 05:06 AM    CL 98 11/03/2023 04:09 AM    CO2 24 11/03/2023 04:09 AM    BUN 10 11/03/2023 04:09 AM    CREATININE 0.8 11/03/2023 04:09 AM    CALCIUM 9.1 11/03/2023 04:09 AM    PHOS 4.8 10/29/2023 04:17 PM         Significant Diagnostic Studies    Radiology:   NM HEPATOBILIARY SCAN W EJECTION FRACTION   Final Result   1. Patent cystic and common bile ducts without evidence of acute cholecystitis. 2. Normal gallbladder ejection fraction. Final report electronically signed by Dr. Valarie Rojas on 11/2/2023 10:35 AM      VL DUP LOWER EXTREMITY VENOUS RIGHT   Final Result   No evidence of a DVT. **This report has been created using voice recognition software. It may contain minor errors which are inherent in voice recognition technology. **      Final report electronically signed by Dr Alondra Jara on 11/1/2023 2:13 PM      US GALLBLADDER RUQ   Final Result   1. Mild gallbladder distention. 2. Cholelithiasis. 3. The common bile duct is mildly dilated at 8 mm. **This report has been created using voice recognition software. It may contain minor errors which are inherent in voice recognition technology. **      Final report

## 2023-11-05 LAB — HLA-B27 QL FC: NORMAL

## 2023-11-06 NOTE — TELEPHONE ENCOUNTER
Spoke with [place]   Both appointment dates and times given to her  She will try to get transportation for patient

## 2023-11-06 NOTE — ADT AUTH CERT
10/30   Last Updated by Ritesh Mondragon RN on 11/6/2023 1251     Review Status Created By   In Primary Ravindra Wang RN       Review Type Associated Date   -- 11/6/2023      Criteria Review   DATE: 10/30          RELEVANT BASELINES: (lab values, vitals, o2 amount/delivery, etc.) RA,  From home        PERTINENT UPDATES:  Transferred to the ICU for concerns for possible intubation 2/2 worsening hypoxia. Required BiPAP support, transitioned back to HF NC today. BiPAP at night and as needed. Keep in ICU; remains moderately tachypneic  Start IV Zosyn   Continued LBP and left hip pain  Planning Echo today  PT/OT. TLSO brace when up        VITALS: T: 97.9: P: 78 RR: 30 B/P: 104/40:   FiO2: 50% (HF NC 50 lpm): O2 Sat:100%:   I/O: Net -3.2L since admit GCS: 15        ABNL/PERTINENT LABS/RADIOLOGY/DIAGNOSTIC STUDIES:  10/30/23 16:05  CO2: 19 (L)  Anion Gap: 19.0 (H)  Total Protein: 6.0 (L)  Troponin, High Sensitivity: 227 (H)  Alk Phos: 258 (H)  RBC: 3.64 (L)  Hemoglobin Quant: 11.5 (L)  Hematocrit: 37.9  MCV: 104.1 (H)  MCHC: 30.3 (L)  RDW-SD: 54.6 (H)  Lymphocytes Absolute: 0.6 (L)  Segs Absolute: 9.2 (H)  Immature Grans (Abs): 0.11 (H)     10/30/23 08:12  ECHO  Summary   Left ventricle size is normal.   Normal left ventricular wall thickness. There was mild global hypokinesis of the left ventricle. Systolic function was mildly reduced. Ejection fraction is visually estimated at 45%. Doppler parameters were consistent with abnormal left ventricular   relaxation (grade 1 diastolic dysfunction). The left atrium is Mildly dilated. Mild-to-moderate aortic regurgitation is noted. Aortic aneurysm noted in the ascending aorta .    Aortic aneurysm measures 4.7 cm   There is moderate degree of right to left shunt on bubble study using   Agitated saline suggestive for PFO ( Patent Foramen Ovale)     10/30/23 14:46  EKG 12-LEAD: Rpt  Normal sinus rhythm  Left axis deviation  Inferior infarct (cited on or

## 2023-11-07 NOTE — PROGRESS NOTES
Physician Progress Note      PATIENTRolanda Gilford  [facility] #:                  [id number]  :                       1960  ADMIT DATE:       10/24/2023 12:19 AM  DISCH DATE:        11/3/2023 2:21 PM  RESPONDING  PROVIDER #:        Barron Breen DO          QUERY TEXT:    Pt admitted with intractable lumbar pain  and underwent right-sided PITO on   10/19/2023 ,  Lumbar fracture was noted, please document in progress notes the relationship   if any between the lumbar fracture  and the surgery: The medical record reflects the following:  Risk Factors: right-sided PITO on 10/19/2023, intractable lumbar pain with   fracture  Clinical Indicators: postop course was complicated by severe hip pain, left   leg radicular pain and lumbar back pain. At that time a CT and MRI lumbar   spine demonstrated L5 pars defect at the L5-S1 level with widening of the disc   space and left neural foraminal narrowing. She was given a brace for   support. Imaging revealed changes at C3, L5-S1, transfer was initiated for further   evaluation by spine team. lumbar changes and associated pain following a R DA   PITO. admitted by orthopedic surgery for low back pain and was found to have a   lumbar fracture. Treatment: Xarelto postop for DVT prophylaxis. PT/OT , Flexeril , Neurontin,    narcotics. Thanks, Bethany Bear  Options provided:  -- Lumbar fracture is due to the procedure  -- Lumbar fracture is not due to the procedure, but is due to other incidental   risk factor, Please specify other incidental risk factor. -- Other - I will add my own diagnosis  -- Disagree - Not applicable / Not valid  -- Disagree - Clinically unable to determine / Unknown  -- Refer to Clinical Documentation Reviewer    PROVIDER RESPONSE TEXT:    Provider is clinically unable to determine a response to this query.     Query created by: Silva Bartholomew on 2023 7:46 AM      Electronically signed by:  Barron Breen DO 2023 10:28 AM

## 2023-11-13 ENCOUNTER — OFFICE VISIT (OUTPATIENT)
Dept: CARDIOLOGY CLINIC | Age: 63
End: 2023-11-13
Payer: COMMERCIAL

## 2023-11-13 VITALS
HEIGHT: 61 IN | HEART RATE: 85 BPM | SYSTOLIC BLOOD PRESSURE: 150 MMHG | BODY MASS INDEX: 32.17 KG/M2 | OXYGEN SATURATION: 97 % | DIASTOLIC BLOOD PRESSURE: 84 MMHG | WEIGHT: 170.4 LBS

## 2023-11-13 DIAGNOSIS — Q21.12 PFO (PATENT FORAMEN OVALE): ICD-10-CM

## 2023-11-13 DIAGNOSIS — I10 ESSENTIAL HYPERTENSION: ICD-10-CM

## 2023-11-13 DIAGNOSIS — I50.32 CHF NYHA CLASS II, CHRONIC, DIASTOLIC (HCC): Primary | ICD-10-CM

## 2023-11-13 PROCEDURE — 99214 OFFICE O/P EST MOD 30 MIN: CPT | Performed by: NURSE PRACTITIONER

## 2023-11-13 PROCEDURE — 3077F SYST BP >= 140 MM HG: CPT | Performed by: NURSE PRACTITIONER

## 2023-11-13 PROCEDURE — 3079F DIAST BP 80-89 MM HG: CPT | Performed by: NURSE PRACTITIONER

## 2023-11-13 ASSESSMENT — ENCOUNTER SYMPTOMS
COUGH: 0
SHORTNESS OF BREATH: 0
ABDOMINAL DISTENTION: 0

## 2023-11-13 NOTE — PROGRESS NOTES
Heart Failure Clinic       Visit Date: 11/13/2023  Cardiologist:  Dr. Aric Oliver  Primary Care Physician: Dr. Nicholas Guido MD  Referred by: Lizbet Rizzo is a 61 y.o. female who presents today for:  Chief Complaint   Patient presents with    Congestive Heart Failure       HPI:   Balir Polanco is a 61 y.o. female who presents to the office for a patient visit in the heart failure clinic. Accompanied by no one  Currently at [facility]    TYPE HF: HFpEF 55%, Grd 1 DD - large R to L PFO   Cause:   Valves:    Device: no  HX: HTN, arthritis    Dry Wt:  170#    Hospitalization:  10/2023 - hypoxia. Required Bipap and High flow O2. No CHF exacerbation. Trialed low dose BB and ACE - hypotensive. Found spontaneous PFO. Weaned to RA  Was s/p right THR on 10/19    Today:   Walked from entrance w/ walker, did well. Doing well w/ therapy. Denies chest pain/pressure. No fluid on exam.  No CHF symptoms noted. BP little high today = states doing well at NH (no readings sent), little nervous    Past Medical History:   Diagnosis Date    Arthritis     Hypertension      History reviewed. No pertinent surgical history. Family History   Problem Relation Age of Onset    Cancer Father      Social History     Tobacco Use    Smoking status: Never    Smokeless tobacco: Never   Substance Use Topics    Alcohol use: No     Current Outpatient Medications   Medication Sig Dispense Refill    rivaroxaban (XARELTO) 10 MG TABS tablet Take 1 tablet by mouth daily 14 tablet 0    gabapentin (NEURONTIN) 300 MG capsule Take 2 capsules by mouth 3 times daily for 30 days.  180 capsule 0    Multiple Vitamins-Minerals (THERAPEUTIC MULTIVITAMIN-MINERALS) tablet Take 1 tablet by mouth daily      Acetaminophen (TYLENOL ARTHRITIS EXT RELIEF PO) Take 650 mg by mouth daily      cyclobenzaprine (FLEXERIL) 10 MG tablet Take 1 tablet by mouth 3 times daily as needed for Muscle spasms      pantoprazole (PROTONIX) 40 MG tablet Take 1

## 2023-11-13 NOTE — PATIENT INSTRUCTIONS
You may receive a survey regarding the care you received during your visit. Your input is valuable to us. We encourage you to complete and return your survey. We hope you will choose us in the future for your healthcare needs. Your nurses today were Anna and [employer].   Office hours:   Mon-Thurs 8-4:30  Friday 8-12  Phone: [phone]    Continue:  Continue current medications  Daily weights and record  Fluid restriction of 2 Liters per day  Limit sodium in diet to around 6954-1219 mg/day  Monitor BP  Activity as tolerated     Call the [address] for any of the following symptoms:   Weight gain of 2-3 pounds in 1 day or 5 pounds in 1 week  Increased shortness of breath  Shortness of breath while laying down  Cough  Chest pain  Swelling in feet, ankles or legs  Bloating in abdomen  Fatigue

## 2023-12-13 PROBLEM — Q21.12 PFO WITH ATRIAL SEPTAL ANEURYSM: Status: ACTIVE | Noted: 2023-12-13

## 2023-12-13 PROBLEM — I25.3 PFO WITH ATRIAL SEPTAL ANEURYSM: Status: ACTIVE | Noted: 2023-12-13

## 2023-12-13 NOTE — PROGRESS NOTES
[facility] PROFESSIONAL SERVICES  [facility] [address]   [address] [facility] [address] [address]   Dept: [phone]   Dept Fax: [phone]: [phone]      Chief Complaint   Patient presents with    Follow-Up from Hospital     No cardiac complaints      Cardiologist:  Dr. Farhad Haddad  62 yo female presents for hfu for hypoxia, found to have large R to L PFO, HFpEF, G 1 Ddx, HTN, aortic aneurysm. At [facility] for rehab. No chest pain, angina, HERNANDEZ, orthopnea, PND, sob at rest, palpitations, LE edema, or syncope. Patient dealing with back and leg issues still. Going through therapy from home now. Wearing back brace today. She has no cardiac commplaints. Dr Farhad Haddad reviewed her PFO and discussed with her extensively. No plans for any intervention at this time. General:   No fever, no chills, no weight loss, no fatigue  Pulmonary:    No dyspnea, no wheezing  Cardiac:    Denies recent chest pain   GI:     No nausea or vomiting, no abdominal pain  Neuro:    No dizziness or light headedness  Musculoskeletal:  No recent active issues  Extremities:   No edema      Past Medical History:   Diagnosis Date    Arthritis     Hypertension        No Known Allergies    Current Outpatient Medications   Medication Sig Dispense Refill    losartan-hydroCHLOROthiazide (HYZAAR) 50-12.5 MG per tablet Take 1 tablet by mouth daily      gabapentin (NEURONTIN) 300 MG capsule Take 2 capsules by mouth 3 times daily for 30 days. (Patient taking differently: Take 2 capsules by mouth daily. ) 180 capsule 0    Multiple Vitamins-Minerals (THERAPEUTIC MULTIVITAMIN-MINERALS) tablet Take 1 tablet by mouth daily      Acetaminophen (TYLENOL ARTHRITIS EXT RELIEF PO) Take 650 mg by mouth daily      rivaroxaban (XARELTO) 10 MG TABS tablet Take 1 tablet by mouth daily (Patient not taking: Reported on 12/14/2023) 14 tablet 0     No current facility-administered medications for this visit.        Social History     Socioeconomic

## 2023-12-14 ENCOUNTER — OFFICE VISIT (OUTPATIENT)
Dept: CARDIOLOGY CLINIC | Age: 63
End: 2023-12-14
Payer: COMMERCIAL

## 2023-12-14 VITALS
WEIGHT: 170.6 LBS | BODY MASS INDEX: 32.21 KG/M2 | SYSTOLIC BLOOD PRESSURE: 147 MMHG | HEART RATE: 71 BPM | HEIGHT: 61 IN | DIASTOLIC BLOOD PRESSURE: 92 MMHG

## 2023-12-14 DIAGNOSIS — I25.3 PFO WITH ATRIAL SEPTAL ANEURYSM: Primary | ICD-10-CM

## 2023-12-14 DIAGNOSIS — I10 ESSENTIAL HYPERTENSION: ICD-10-CM

## 2023-12-14 DIAGNOSIS — Q21.12 PFO WITH ATRIAL SEPTAL ANEURYSM: Primary | ICD-10-CM

## 2023-12-14 PROCEDURE — 3077F SYST BP >= 140 MM HG: CPT | Performed by: STUDENT IN AN ORGANIZED HEALTH CARE EDUCATION/TRAINING PROGRAM

## 2023-12-14 PROCEDURE — 3080F DIAST BP >= 90 MM HG: CPT | Performed by: STUDENT IN AN ORGANIZED HEALTH CARE EDUCATION/TRAINING PROGRAM

## 2023-12-14 PROCEDURE — 99214 OFFICE O/P EST MOD 30 MIN: CPT | Performed by: STUDENT IN AN ORGANIZED HEALTH CARE EDUCATION/TRAINING PROGRAM

## 2023-12-14 RX ORDER — LOSARTAN POTASSIUM AND HYDROCHLOROTHIAZIDE 12.5; 5 MG/1; MG/1
1 TABLET ORAL DAILY
COMMUNITY

## 2024-07-08 ENCOUNTER — OFFICE VISIT (OUTPATIENT)
Dept: CARDIOLOGY CLINIC | Age: 64
End: 2024-07-08
Payer: COMMERCIAL

## 2024-07-08 VITALS
HEART RATE: 92 BPM | BODY MASS INDEX: 30.21 KG/M2 | WEIGHT: 160 LBS | SYSTOLIC BLOOD PRESSURE: 130 MMHG | DIASTOLIC BLOOD PRESSURE: 74 MMHG | HEIGHT: 61 IN

## 2024-07-08 DIAGNOSIS — I25.3 PFO WITH ATRIAL SEPTAL ANEURYSM: Primary | ICD-10-CM

## 2024-07-08 DIAGNOSIS — Q21.12 PFO WITH ATRIAL SEPTAL ANEURYSM: Primary | ICD-10-CM

## 2024-07-08 PROCEDURE — 3078F DIAST BP <80 MM HG: CPT | Performed by: INTERNAL MEDICINE

## 2024-07-08 PROCEDURE — 3075F SYST BP GE 130 - 139MM HG: CPT | Performed by: INTERNAL MEDICINE

## 2024-07-08 PROCEDURE — 99213 OFFICE O/P EST LOW 20 MIN: CPT | Performed by: INTERNAL MEDICINE

## 2024-07-08 RX ORDER — ATORVASTATIN CALCIUM 20 MG/1
TABLET, FILM COATED ORAL
COMMUNITY
Start: 2024-04-24

## 2024-07-08 NOTE — PROGRESS NOTES
Electronically signed by Milton Ibarra MD   7/8/2024 at 11:01 AM KATHARINET